# Patient Record
Sex: MALE | Race: WHITE | Employment: OTHER | URBAN - METROPOLITAN AREA
[De-identification: names, ages, dates, MRNs, and addresses within clinical notes are randomized per-mention and may not be internally consistent; named-entity substitution may affect disease eponyms.]

---

## 2019-02-01 ENCOUNTER — APPOINTMENT (OUTPATIENT)
Dept: CT IMAGING | Age: 29
End: 2019-02-01
Payer: MEDICAID

## 2019-02-01 ENCOUNTER — APPOINTMENT (OUTPATIENT)
Dept: GENERAL RADIOLOGY | Age: 29
End: 2019-02-01
Payer: MEDICAID

## 2019-02-01 ENCOUNTER — HOSPITAL ENCOUNTER (EMERGENCY)
Age: 29
Discharge: HOME | End: 2019-02-01
Payer: MEDICAID

## 2019-02-01 VITALS
WEIGHT: 200 LBS | HEART RATE: 94 BPM | TEMPERATURE: 98.7 F | SYSTOLIC BLOOD PRESSURE: 127 MMHG | OXYGEN SATURATION: 96 % | DIASTOLIC BLOOD PRESSURE: 77 MMHG | BODY MASS INDEX: 31.32 KG/M2 | RESPIRATION RATE: 16 BRPM

## 2019-02-01 DIAGNOSIS — M25.512 ACUTE PAIN OF LEFT SHOULDER: ICD-10-CM

## 2019-02-01 DIAGNOSIS — S00.01XA ABRASION OF SCALP, INITIAL ENCOUNTER: ICD-10-CM
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
## 2019-02-01 DIAGNOSIS — M25.561 ACUTE PAIN OF RIGHT KNEE: ICD-10-CM

## 2019-02-01 DIAGNOSIS — S09.90XA CLOSED HEAD INJURY, INITIAL ENCOUNTER: ICD-10-CM

## 2019-02-01 PROCEDURE — 71046 X-RAY EXAM CHEST 2 VIEWS: CPT

## 2019-02-01 PROCEDURE — 73030 X-RAY EXAM OF SHOULDER: CPT

## 2019-02-01 PROCEDURE — 70450 CT HEAD/BRAIN W/O DYE: CPT

## 2019-02-01 PROCEDURE — 6370000000 HC RX 637 (ALT 250 FOR IP): Performed by: PHYSICIAN ASSISTANT

## 2019-02-01 PROCEDURE — 99284 EMERGENCY DEPT VISIT MOD MDM: CPT

## 2019-02-01 PROCEDURE — 96372 THER/PROPH/DIAG INJ SC/IM: CPT

## 2019-02-01 PROCEDURE — 6360000002 HC RX W HCPCS: Performed by: PHYSICIAN ASSISTANT

## 2019-02-01 PROCEDURE — 72125 CT NECK SPINE W/O DYE: CPT

## 2019-02-01 PROCEDURE — 73560 X-RAY EXAM OF KNEE 1 OR 2: CPT

## 2019-02-01 RX ORDER — KETOROLAC TROMETHAMINE 30 MG/ML
60 INJECTION, SOLUTION INTRAMUSCULAR; INTRAVENOUS ONCE
Status: COMPLETED | OUTPATIENT
Start: 2019-02-01 | End: 2019-02-01

## 2019-02-01 RX ORDER — METHOCARBAMOL 500 MG/1
500 TABLET, FILM COATED ORAL 3 TIMES DAILY
Qty: 9 TABLET | Refills: 0 | Status: SHIPPED | OUTPATIENT
Start: 2019-02-01 | End: 2021-07-01 | Stop reason: CLARIF

## 2019-02-01 RX ORDER — NAPROXEN 500 MG/1
500 TABLET ORAL 2 TIMES DAILY PRN
Qty: 20 TABLET | Refills: 0 | Status: SHIPPED | OUTPATIENT
Start: 2019-02-01 | End: 2021-07-01 | Stop reason: CLARIF

## 2019-02-01 RX ORDER — ACETAMINOPHEN 500 MG
500 TABLET ORAL EVERY 6 HOURS PRN
Qty: 20 TABLET | Refills: 0 | Status: SHIPPED | OUTPATIENT
Start: 2019-02-01 | End: 2021-07-01 | Stop reason: CLARIF

## 2019-02-01 RX ORDER — HYDROCODONE BITARTRATE AND ACETAMINOPHEN 5; 325 MG/1; MG/1
1 TABLET ORAL ONCE
Status: COMPLETED | OUTPATIENT
Start: 2019-02-01 | End: 2019-02-01

## 2019-02-01 RX ADMIN — KETOROLAC TROMETHAMINE 60 MG: 30 INJECTION, SOLUTION INTRAMUSCULAR at 06:21

## 2019-02-01 RX ADMIN — HYDROCODONE BITARTRATE AND ACETAMINOPHEN 1 TABLET: 5; 325 TABLET ORAL at 07:07

## 2019-02-01 ASSESSMENT — PAIN DESCRIPTION - LOCATION: LOCATION: SHOULDER

## 2019-02-01 ASSESSMENT — PAIN DESCRIPTION - PAIN TYPE: TYPE: ACUTE PAIN

## 2019-02-01 ASSESSMENT — PAIN SCALES - GENERAL
PAINLEVEL_OUTOF10: 10
PAINLEVEL_OUTOF10: 9
PAINLEVEL_OUTOF10: 10
PAINLEVEL_OUTOF10: 9

## 2019-02-01 ASSESSMENT — PAIN DESCRIPTION - ORIENTATION: ORIENTATION: LEFT

## 2019-02-01 ASSESSMENT — PAIN DESCRIPTION - PROGRESSION: CLINICAL_PROGRESSION: GRADUALLY WORSENING

## 2021-07-01 ENCOUNTER — OFFICE VISIT (OUTPATIENT)
Dept: INTERNAL MEDICINE CLINIC | Age: 31
End: 2021-07-01
Payer: COMMERCIAL

## 2021-07-01 VITALS
HEIGHT: 66 IN | OXYGEN SATURATION: 98 % | DIASTOLIC BLOOD PRESSURE: 78 MMHG | RESPIRATION RATE: 20 BRPM | WEIGHT: 222.2 LBS | HEART RATE: 75 BPM | SYSTOLIC BLOOD PRESSURE: 121 MMHG | BODY MASS INDEX: 35.71 KG/M2

## 2021-07-01 DIAGNOSIS — R10.11 RUQ PAIN: ICD-10-CM

## 2021-07-01 DIAGNOSIS — G89.29 CHRONIC LEFT SHOULDER PAIN: ICD-10-CM

## 2021-07-01 DIAGNOSIS — Z11.59 NEED FOR HEPATITIS C SCREENING TEST: ICD-10-CM

## 2021-07-01 DIAGNOSIS — Z11.4 ENCOUNTER FOR SCREENING FOR HIV: ICD-10-CM

## 2021-07-01 DIAGNOSIS — F17.200 TOBACCO DEPENDENCE: ICD-10-CM

## 2021-07-01 DIAGNOSIS — R74.01 ELEVATED ALT MEASUREMENT: ICD-10-CM

## 2021-07-01 DIAGNOSIS — K21.9 GASTROESOPHAGEAL REFLUX DISEASE WITHOUT ESOPHAGITIS: ICD-10-CM

## 2021-07-01 DIAGNOSIS — Z00.00 ENCOUNTER FOR WELLNESS EXAMINATION: Primary | ICD-10-CM

## 2021-07-01 DIAGNOSIS — M25.512 CHRONIC LEFT SHOULDER PAIN: ICD-10-CM

## 2021-07-01 PROCEDURE — 4004F PT TOBACCO SCREEN RCVD TLK: CPT | Performed by: NURSE PRACTITIONER

## 2021-07-01 PROCEDURE — 99204 OFFICE O/P NEW MOD 45 MIN: CPT | Performed by: NURSE PRACTITIONER

## 2021-07-01 PROCEDURE — G8417 CALC BMI ABV UP PARAM F/U: HCPCS | Performed by: NURSE PRACTITIONER

## 2021-07-01 PROCEDURE — G8427 DOCREV CUR MEDS BY ELIG CLIN: HCPCS | Performed by: NURSE PRACTITIONER

## 2021-07-01 RX ORDER — OMEPRAZOLE 40 MG/1
40 CAPSULE, DELAYED RELEASE ORAL
Qty: 30 CAPSULE | Refills: 3 | Status: SHIPPED | OUTPATIENT
Start: 2021-07-01 | End: 2021-10-02

## 2021-07-01 ASSESSMENT — PATIENT HEALTH QUESTIONNAIRE - PHQ9
SUM OF ALL RESPONSES TO PHQ9 QUESTIONS 1 & 2: 0
SUM OF ALL RESPONSES TO PHQ QUESTIONS 1-9: 0
SUM OF ALL RESPONSES TO PHQ9 QUESTIONS 1 & 2: 0
2. FEELING DOWN, DEPRESSED OR HOPELESS: 0
SUM OF ALL RESPONSES TO PHQ QUESTIONS 1-9: 0
DEPRESSION UNABLE TO ASSESS: FUNCTIONAL CAPACITY MOTIVATION LIMITS ACCURACY
1. LITTLE INTEREST OR PLEASURE IN DOING THINGS: 0
SUM OF ALL RESPONSES TO PHQ QUESTIONS 1-9: 0
2. FEELING DOWN, DEPRESSED OR HOPELESS: 0
1. LITTLE INTEREST OR PLEASURE IN DOING THINGS: 0

## 2021-07-01 ASSESSMENT — ENCOUNTER SYMPTOMS
EYES NEGATIVE: 1
ABDOMINAL PAIN: 1
DIARRHEA: 0
SORE THROAT: 0
ABDOMINAL DISTENTION: 0
CHEST TIGHTNESS: 0
WHEEZING: 0
SINUS PRESSURE: 0
COLOR CHANGE: 0
SINUS PAIN: 0
COUGH: 0
NAUSEA: 0
SHORTNESS OF BREATH: 0
CONSTIPATION: 0

## 2021-07-01 NOTE — PROGRESS NOTES
Bindu Cardona   28 y.o.  male  [id number]      Chief Complaint   Patient presents with   [address] [facility] [place] Other     Pt has hx of elevated liver function         Subjective:  Patient is here to establish care. He was a previous patient of Dr Hugo Singh last being seen 4-5 years or more ago. Patient has a history of multiple orthopedic injuries/surgies (left shoulder, right knee, right hand) and current complaints are as below. Health maintenance reviewed with patient. Patient does smoke and has been a 1 ppd on average for about 21 years. Has failed Nicoderm patches, nicotine gum, Wellbutrin and Chantix. Considered seeing a hypnotist. Patient does drink alcohol occasionally. Patient  does not use drugs. His main concern today is that during the operative/follow up period from his recent left shoulder surgery. He states that this provider obtained a LFT test which showed an ALT of 89. Admits that he has been using Tylenol very frequently for pain control. Denies any blood in stools, or black stools, but does have frequent epigastric pain occasionally radiating into RUQ. Does take Tums frequently. Also recently with early satiety. After his left shoulder surgery he reports that he still has ongoing pain issues and his surgeon will no longer prescribe pain medications, advising him to only take Tylenol. Is interested in seeing pain management at this time. Patient's past medical, surgical, social and/or family history reviewed, updated in chart. Medications and allergies also reviewed and updatedin chart.      -wants Hep C/HIV Screen    Review of Systems   Constitutional: Negative for activity change, appetite change, fatigue and fever. HENT: Negative for congestion, sinus pressure, sinus pain and sore throat. Eyes: Negative. Respiratory: Negative for cough, chest tightness, shortness of breath and wheezing. Cardiovascular: Negative for chest pain and palpitations.    Gastrointestinal: About Running Out of Food in the Last Year:     Gerardo of Food in the Last Year:    Transportation Needs:     Lack of Transportation (Medical):  Lack of Transportation (Non-Medical):    Physical Activity:     Days of Exercise per Week:     Minutes of Exercise per Session:    Stress:     Feeling of Stress :    Social Connections:     Frequency of Communication with Friends and Family:     Frequency of Social Gatherings with Friends and Family:     Attends [employer] Services:     Active Member of Clubs or Organizations:     Attends Club or Organization Meetings:     Marital Status:    Intimate Partner Violence:     Fear of Current or Ex-Partner:     Emotionally Abused:     Physically Abused:     Sexually Abused:        Objective:  /78   Pulse 75   Resp 20   Ht 5' 6\" (1.676 m)   Wt 222 lb 3.2 oz (100.8 kg)   SpO2 98%   BMI 35.86 kg/m²   BP Readings from Last 3 Encounters:   07/01/21 121/78   02/01/19 127/77   11/10/14 110/80     Wt Readings from Last 3 Encounters:   07/01/21 222 lb 3.2 oz (100.8 kg)   02/01/19 200 lb (90.7 kg)   11/10/14 204 lb (92.5 kg)       Physical Exam  Constitutional:       General: He is not in acute distress. Appearance: He is well-developed. He is not diaphoretic. HENT:      Head: Normocephalic and atraumatic. Nose: Nose normal.   Eyes:      Conjunctiva/sclera: Conjunctivae normal.      Pupils: Pupils are equal, round, and reactive to light. Neck:      Thyroid: No thyromegaly. Cardiovascular:      Rate and Rhythm: Normal rate and regular rhythm. Heart sounds: Normal heart sounds. No murmur heard. Pulmonary:      Effort: Pulmonary effort is normal.      Breath sounds: Normal breath sounds. Abdominal:      General: Bowel sounds are normal. There is no distension. Palpations: Abdomen is soft. Tenderness: There is no abdominal tenderness. Musculoskeletal:         General: Normal range of motion.    Lymphadenopathy:      Cervical: No cervical adenopathy. Skin:     General: Skin is warm and dry. Capillary Refill: Capillary refill takes less than 2 seconds. Findings: No rash. Neurological:      Mental Status: He is alert and oriented to person, place, and time. GCS: GCS eye subscore is 4. GCS verbal subscore is 5. GCS motor subscore is 6. Cranial Nerves: No cranial nerve deficit. Sensory: No sensory deficit. Coordination: Coordination normal.      Deep Tendon Reflexes: Reflexes normal.   Psychiatric:         Behavior: Behavior normal.         Thought Content: Thought content normal.         No results found for: WBC, HGB, HCT, MCV, PLT  Lab Results   Component Value Date     11/10/2014    K 3.4 (L) 11/10/2014     11/10/2014    CO2 27 11/10/2014    BUN 14 11/10/2014    CREATININE 1.0 11/10/2014    GLUCOSE 100 (H) 11/10/2014    CALCIUM 9.4 11/10/2014    PROT 7.2 11/10/2014    LABALBU 4.7 11/10/2014    BILITOT 0.3 11/10/2014    ALKPHOS 99 11/10/2014    AST 19 11/10/2014    ALT 37 11/10/2014    LABGLOM >60 11/10/2014    GFRAA >60 11/10/2014    AGRATIO 1.9 11/10/2014    GLOB 2.5 11/10/2014     Lab Results   Component Value Date    CHOL 222 (H) 11/10/2014    CHOL 207 (H) 09/13/2013     Lab Results   Component Value Date    TRIG 326 (H) 11/10/2014    TRIG 208 (H) 09/13/2013     Lab Results   Component Value Date    HDL 32 (L) 11/10/2014    HDL 37 (L) 09/13/2013     Lab Results   Component Value Date    LDLCALC see below 11/10/2014    [address] (H) 09/13/2013     No results found for: LABA1C  No results found for: TSHFT4, TSH, TSHHS    ASSESSMENT:      No diagnosis found. PLAN:  1. BP and all vitals at goal; will check preventative labs and address any concerns. Otherwise, as below. 2. Possibly second to excessive Tylenol use; recheck LFT's today and also Hep C screen and Liver US. 3. Same as 2; check US, but seems more referred from epigastric region so I suspect this is likely more GERD related. 4. Start PPI therapy with omeprazole; call if no improvement in 1 month. 5. Patient was educated on the risks of continued smoking/tobacco use including, but not limited too, cancer and lung diease. Patient verbalizes understanding of risks, however, declines being ready to attempt complete cessation. States they will notify us when ready. Available resources and treatment options discussed with patient. 6. Referral to pain management per patient request as he feels as options have been exacerbated by his surgeon. 7. HIV Screen ordered  8. Hep C screen ordered. Care discussed with patient. Questions answered. Patient verbalizes understanding and agrees with plan. After visit summary provided. Advised to call forany problems, questions, or concerns. No orders of the defined types were placed in this encounter. No follow-ups on file.     MARTHA Broussard CNP  07/01/21  9:55 AM

## 2021-07-06 ENCOUNTER — HOSPITAL ENCOUNTER (OUTPATIENT)
Dept: ULTRASOUND IMAGING | Age: 31
Discharge: HOME | End: 2021-07-06
Payer: COMMERCIAL

## 2021-07-06 DIAGNOSIS — R10.11 RUQ PAIN: ICD-10-CM

## 2021-07-06 DIAGNOSIS — R74.01 ELEVATED ALT MEASUREMENT: ICD-10-CM

## 2021-07-06 PROCEDURE — 76705 ECHO EXAM OF ABDOMEN: CPT

## 2021-07-07 DIAGNOSIS — R10.11 RUQ PAIN: ICD-10-CM

## 2021-07-07 DIAGNOSIS — R10.11 RUQ PAIN: Primary | ICD-10-CM

## 2021-07-07 DIAGNOSIS — R74.01 ELEVATED ALT MEASUREMENT: ICD-10-CM

## 2021-07-07 DIAGNOSIS — Z00.00 ENCOUNTER FOR WELLNESS EXAMINATION: ICD-10-CM

## 2021-07-07 DIAGNOSIS — Z11.59 NEED FOR HEPATITIS C SCREENING TEST: ICD-10-CM

## 2021-07-07 DIAGNOSIS — Z11.4 ENCOUNTER FOR SCREENING FOR HIV: ICD-10-CM

## 2021-07-07 LAB
A/G RATIO: 1.9 (ref 1.1–2.2)
ALBUMIN SERPL-MCNC: 4.8 G/DL (ref 3.4–5)
ALP BLD-CCNC: 116 U/L (ref 40–129)
ALT SERPL-CCNC: 68 U/L (ref 10–40)
ANION GAP SERPL CALCULATED.3IONS-SCNC: 14 MMOL/L (ref 3–16)
AST SERPL-CCNC: 39 U/L (ref 15–37)
BASOPHILS ABSOLUTE: 0 K/UL (ref 0–0.2)
BASOPHILS RELATIVE PERCENT: 0.3 %
BILIRUB SERPL-MCNC: <0.2 MG/DL (ref 0–1)
BUN BLDV-MCNC: 14 MG/DL (ref 7–20)
CALCIUM SERPL-MCNC: 9.6 MG/DL (ref 8.3–10.6)
CHLORIDE BLD-SCNC: 103 MMOL/L (ref 99–110)
CHOLESTEROL, FASTING: 251 MG/DL (ref 0–199)
CO2: 23 MMOL/L (ref 21–32)
CREAT SERPL-MCNC: 1 MG/DL (ref 0.9–1.3)
EOSINOPHILS ABSOLUTE: 0.1 K/UL (ref 0–0.6)
EOSINOPHILS RELATIVE PERCENT: 1.2 %
GFR AFRICAN AMERICAN: >60
GFR NON-AFRICAN AMERICAN: >60
GLOBULIN: 2.5 G/DL
GLUCOSE BLD-MCNC: 103 MG/DL (ref 70–99)
HCT VFR BLD CALC: 47.6 % (ref 40.5–52.5)
HDLC SERPL-MCNC: 41 MG/DL (ref 40–60)
HEMOGLOBIN: 16.4 G/DL (ref 13.5–17.5)
HEPATITIS C ANTIBODY INTERPRETATION: NORMAL
LDL CHOLESTEROL CALCULATED: ABNORMAL MG/DL
LYMPHOCYTES ABSOLUTE: 2.2 K/UL (ref 1–5.1)
LYMPHOCYTES RELATIVE PERCENT: 31.5 %
MCH RBC QN AUTO: 30 PG (ref 26–34)
MCHC RBC AUTO-ENTMCNC: 34.4 G/DL (ref 31–36)
MCV RBC AUTO: 87.2 FL (ref 80–100)
MONOCYTES ABSOLUTE: 0.5 K/UL (ref 0–1.3)
MONOCYTES RELATIVE PERCENT: 7.1 %
NEUTROPHILS ABSOLUTE: 4.2 K/UL (ref 1.7–7.7)
NEUTROPHILS RELATIVE PERCENT: 59.9 %
PDW BLD-RTO: 13.9 % (ref 12.4–15.4)
PLATELET # BLD: 274 K/UL (ref 135–450)
PMV BLD AUTO: 7.7 FL (ref 5–10.5)
POTASSIUM SERPL-SCNC: 4.7 MMOL/L (ref 3.5–5.1)
RBC # BLD: 5.46 M/UL (ref 4.2–5.9)
SODIUM BLD-SCNC: 140 MMOL/L (ref 136–145)
TOTAL PROTEIN: 7.3 G/DL (ref 6.4–8.2)
TRIGLYCERIDE, FASTING: 374 MG/DL (ref 0–150)
VLDLC SERPL CALC-MCNC: ABNORMAL MG/DL
WBC # BLD: 7 K/UL (ref 4–11)

## 2021-07-07 PROCEDURE — 36415 COLL VENOUS BLD VENIPUNCTURE: CPT | Performed by: NURSE PRACTITIONER

## 2021-07-08 LAB
HIV AG/AB: NORMAL
HIV ANTIGEN: NORMAL
HIV-1 ANTIBODY: NORMAL
HIV-2 AB: NORMAL
LDL CHOLESTEROL DIRECT: 171 MG/DL
TSH REFLEX: 0.93 UIU/ML (ref 0.27–4.2)

## 2021-07-08 RX ORDER — PRAVASTATIN SODIUM 20 MG
20 TABLET ORAL DAILY
Qty: 30 TABLET | Refills: 2 | Status: SHIPPED | OUTPATIENT
Start: 2021-07-08 | End: 2021-10-02

## 2021-08-04 ENCOUNTER — OFFICE VISIT (OUTPATIENT)
Dept: GASTROENTEROLOGY | Age: 31
End: 2021-08-04
Payer: COMMERCIAL

## 2021-08-04 VITALS
BODY MASS INDEX: 34.09 KG/M2 | SYSTOLIC BLOOD PRESSURE: 124 MMHG | HEART RATE: 86 BPM | DIASTOLIC BLOOD PRESSURE: 80 MMHG | TEMPERATURE: 97.2 F | WEIGHT: 217.2 LBS | HEIGHT: 67 IN | OXYGEN SATURATION: 90 %

## 2021-08-04 DIAGNOSIS — K92.1 BLOOD IN STOOL: Primary | ICD-10-CM

## 2021-08-04 DIAGNOSIS — R10.11 RIGHT UPPER QUADRANT ABDOMINAL PAIN: ICD-10-CM

## 2021-08-04 DIAGNOSIS — R11.0 NAUSEA: ICD-10-CM

## 2021-08-04 DIAGNOSIS — R74.8 ELEVATED LIVER ENZYMES: ICD-10-CM

## 2021-08-04 DIAGNOSIS — K21.9 GASTROESOPHAGEAL REFLUX DISEASE, UNSPECIFIED WHETHER ESOPHAGITIS PRESENT: ICD-10-CM

## 2021-08-04 DIAGNOSIS — K59.04 CHRONIC IDIOPATHIC CONSTIPATION: ICD-10-CM

## 2021-08-04 PROCEDURE — 4004F PT TOBACCO SCREEN RCVD TLK: CPT | Performed by: NURSE PRACTITIONER

## 2021-08-04 PROCEDURE — G8427 DOCREV CUR MEDS BY ELIG CLIN: HCPCS | Performed by: NURSE PRACTITIONER

## 2021-08-04 PROCEDURE — 99204 OFFICE O/P NEW MOD 45 MIN: CPT | Performed by: NURSE PRACTITIONER

## 2021-08-04 PROCEDURE — G8417 CALC BMI ABV UP PARAM F/U: HCPCS | Performed by: NURSE PRACTITIONER

## 2021-08-04 RX ORDER — DOCUSATE SODIUM 100 MG/1
100 CAPSULE, LIQUID FILLED ORAL 2 TIMES DAILY
Qty: 60 CAPSULE | Refills: 3 | Status: SHIPPED | OUTPATIENT
Start: 2021-08-04 | End: 2021-09-03

## 2021-08-04 RX ORDER — POLYETHYLENE GLYCOL 3350 17 G/17G
17 POWDER, FOR SOLUTION ORAL 2 TIMES DAILY
Qty: 1020 G | Refills: 3 | Status: SHIPPED | OUTPATIENT
Start: 2021-08-04 | End: 2021-10-02

## 2021-08-04 RX ORDER — POLYETHYLENE GLYCOL 3350, SODIUM SULFATE ANHYDROUS, SODIUM BICARBONATE, SODIUM CHLORIDE, POTASSIUM CHLORIDE 236; 22.74; 6.74; 5.86; 2.97 G/4L; G/4L; G/4L; G/4L; G/4L
4 POWDER, FOR SOLUTION ORAL ONCE
Qty: 4000 ML | Refills: 0 | Status: SHIPPED | OUTPATIENT
Start: 2021-08-04 | End: 2021-08-04

## 2021-08-04 RX ORDER — ONDANSETRON 4 MG/1
4 TABLET, FILM COATED ORAL DAILY PRN
Qty: 30 TABLET | Refills: 3 | Status: SHIPPED | OUTPATIENT
Start: 2021-08-04 | End: 2021-10-02

## 2021-08-04 ASSESSMENT — ENCOUNTER SYMPTOMS
BLOOD IN STOOL: 1
EYE DISCHARGE: 0
BACK PAIN: 1
WHEEZING: 0
ABDOMINAL PAIN: 1
CONSTIPATION: 1
NAUSEA: 1
SHORTNESS OF BREATH: 0
COLOR CHANGE: 0
EYE PAIN: 0
VOMITING: 0
COUGH: 1
DIARRHEA: 1

## 2021-08-04 NOTE — PATIENT INSTRUCTIONS
Patient Education        Liver Disease Diet: Care Instructions  Overview     The liver does many jobs that are vital to the rest of your body. When something is wrong with the liver, your body may not get the nutrition it needs. It is important that you eat a healthy diet that includes a variety of foods from the basic food groups: grains, vegetables, fruits, dairy, and protein foods. Follow your doctor's instructions for eating carbohydrate, protein, and fat in the right amounts for you. Your doctor also may limit salt or take salt out of your diet to help protect the liver. Always talk with your doctor or dietitian before you make changes in your diet. Follow-up care is a key part of your treatment and safety. Be sure to make and go to all appointments, and call your doctor if you are having problems. It's also a good idea to know your test results and keep a list of the medicines you take. How can you care for yourself at home? · Work with your doctor or dietitian to create a food plan that guides your daily food choices. · Do not skip meals or go for many hours without eating. If you eat several small meals during the day, you have a better chance of getting the extra calories your body needs for energy. · Follow your doctor's or dietitian's instructions on how to get the right amount of protein in your diet. Examples of animal protein are:  ? Meat, fish, and poultry. ? Eggs. ? Milk and milk products. · Your doctor or dietitian may ask you to eat a certain amount of protein that comes from plants (rather than protein that comes from animals). You can get plant protein from foods such as:  ? Cooked dried beans and peas. ? Peanut butter, nuts, and seeds. ? Tofu. · Limit salt, if your doctor tells you to. This will help prevent fluid buildup in your belly and chest, which can cause serious problems. Salt is in many prepared foods, such as zuñiga, canned foods, snack foods, sauces, and soups.  Look for reduced-salt products. · Your doctor may recommend vitamin and mineral supplements. However, do not take any other medicine, including over-the-counter medicines, vitamins, and herbal products, without talking to your doctor first.  · Do not drink any alcohol. It can harm your liver. Talk to your doctor if you need help to stop drinking. · If you have a loss of appetite or have nausea or vomiting, try to:  ? Stay away from foods and food smells that make you feel worse. ? Avoid greasy or fatty foods. ? Eat food that settles your stomach when it feels upset. Try crackers, dry toast, or kenneth (kenneth tea, hard kenneth candy, or crystallized kenneth). When should you call for help? Watch closely for changes in your health, and be sure to contact your doctor if you have any problems. Where can you learn more? Go to https://[email or web address].eSpark. org and sign in to your [employer] account. Enter X625 in the Quora box to learn more about \"Liver Disease Diet: Care Instructions. \"     If you do not have an account, please click on the \"Sign Up Now\" link. Current as of: December 17, 2020               Content Version: 12.9  © 2006-2021 Healthwise, Incorporated. Care instructions adapted under license by [facility] [place] ([facility]). If you have questions about a medical condition or this instruction, always ask your healthcare professional. [place] [address] any warranty or liability for your use of this information.

## 2021-08-04 NOTE — PROGRESS NOTES
Shelia Austin 28 y.o. male was seen by MIRIAN Vazquez on 8/4/2021     Wt Readings from Last 3 Encounters:   08/04/21 217 lb 3.2 oz (98.5 kg)   07/01/21 222 lb 3.2 oz (100.8 kg)   02/01/19 200 lb (90.7 kg)       HPI  Shelia Austin is a pleasant 28 y.o.  male who presents today for acid reflux, blood in stool, right upper quadrant pain and elevated liver enzymes. He has a past medical history of clotting disorder and hernia, inguinal, left. He denies alcohol use. He has never had an EGD or colonoscopy. He mentioned having two surgeries in the past four years with poor pain control. Per patient record in 2019 he took additional Tylenol 500 mg six to eight tablets every hour for max of roughly 65 capsules daily for  right hand surgery. In April of 2021 he had left shoulder surgery and took Tylenol 500 mg (75 to 80 capsules per day) until the end of June 2021. His abdominal ultrasound done on 7-6-2021 showed nonspecific hepatomegaly measuring 21 cm, otherwise normal ultrasound. His LFTS on 6- showed Albumin 4.6, Total Bilirubin 0.4, , ALT 89 and AST 35. His appetite is poor with early satiety. He drinks aleena orange creme soda at least six to eight bottles per day. He He is only eating one meal per day. He does eat late at night. His appetite has changed in the last four months. His weight is stable. In the last four months having nausea daily. Vomiting bile; with last episode this morning. His heartburn and acid reflux has been ongoing for twelve years. His heartburn is poorly controlled with Prilosec. He has been taking Prilosec with food. Nocturnal awakenings with acid reflux every evening. No dysphagia or pain with swallowing. No current abdominal pain. His last episode of pain occurred last night to his right upper quadrant described as a 8/10 dull pain with sharp stabbing pain. Pain is worse if he takes Tylenol or Excedrin and activity.   Pain improves with time and rest.  No excess belching. He has excess flatulence. He has chronic constipaton and diarrhea for the last ten years. His typical bowel pattern is once a week with soft brown formed to loose stools. Diarrhea once a week. He has noticed blood and mucus in his stools with each bowel movement; he first noticed ten years ago. He has hemorrhoids. No melena. His is uncertain of his family history for stomach or colon cancer. ROS  Review of Systems   Constitutional: Positive for appetite change. Negative for chills, diaphoresis, fatigue, fever and unexpected weight change. HENT: Negative for ear pain, hearing loss and tinnitus. Eyes: Negative for pain, discharge and visual disturbance. Respiratory: Positive for cough. Negative for shortness of breath and wheezing. Cardiovascular: Negative for chest pain, palpitations and leg swelling. Gastrointestinal: Positive for abdominal pain, blood in stool, constipation, diarrhea and nausea. Negative for vomiting. Endocrine: Negative for cold intolerance and heat intolerance. Genitourinary: Negative for dysuria, frequency, hematuria and urgency. Musculoskeletal: Positive for back pain. Negative for myalgias and neck pain. Skin: Negative for color change, pallor and rash. Allergic/Immunologic: Positive for food allergies (coconut). Negative for environmental allergies. Neurological: Positive for headaches. Negative for dizziness, seizures and weakness. Hematological: Bruises/bleeds easily. Psychiatric/Behavioral: Negative for dysphoric mood and sleep disturbance. The patient is not nervous/anxious.         Allergies  Allergies   Allergen Reactions    Nortriptyline Rash    Paroxetine Shortness Of Breath    Coconut Flavor      Anything with coconut       Medications  Current Outpatient Medications   Medication Sig Dispense Refill    omeprazole (PRILOSEC) 40 MG delayed release capsule Take 1 capsule by mouth every morning (before breakfast) 30 capsule 3    pravastatin (PRAVACHOL) 20 MG tablet Take 1 tablet by mouth daily (Patient not taking: Reported on 8/4/2021) 30 tablet 2     No current facility-administered medications for this visit. Past medical history:   He has a past medical history of Clotting disorder ([address] and Hernia, inguinal, left. Past surgical history:  He has a past surgical history that includes Knee arthroscopy (Right, 2002). Social History:  He reports that he has been smoking cigarettes. He has a 21.00 pack-year smoking history. He has quit using smokeless tobacco.  His smokeless tobacco use included chew. He reports that he does not drink alcohol and does not use drugs. Family history:  His family history includes Cancer in his maternal grandmother; Diabetes in his mother; Emphysema in his maternal grandmother. Objective    Vitals:    08/04/21 1340   BP: 124/80   Pulse: 86   Temp: 97.2 °F (36.2 °C)   SpO2: 90%        Physical exam    Physical Exam  Vitals reviewed. Constitutional:       General: He is not in acute distress. Appearance: He is well-developed. He is obese. He is not ill-appearing, toxic-appearing or diaphoretic. HENT:      Head: Normocephalic and atraumatic. Nose: Nose normal.      Mouth/Throat:      Mouth: Mucous membranes are moist.   Eyes:      Conjunctiva/sclera: Conjunctivae normal.      Pupils: Pupils are equal, round, and reactive to light. Neck:      Thyroid: No thyromegaly. Vascular: No JVD. Trachea: No tracheal deviation. Cardiovascular:      Rate and Rhythm: Normal rate and regular rhythm. Pulses: Normal pulses. Heart sounds: Normal heart sounds. No murmur heard. No friction rub. No gallop. Pulmonary:      Effort: Pulmonary effort is normal. No respiratory distress. Breath sounds: Normal breath sounds. No stridor. No wheezing, rhonchi or rales. Chest:      Chest wall: No tenderness.    Abdominal:      General: Bowel sounds are normal. There is no distension. Palpations: Abdomen is soft. There is no mass. Tenderness: There is no abdominal tenderness. There is no guarding or rebound. Hernia: No hernia is present. Musculoskeletal:         General: Normal range of motion. Cervical back: Normal range of motion and neck supple. Lymphadenopathy:      Cervical: No cervical adenopathy. Skin:     General: Skin is warm and dry. Neurological:      Mental Status: He is alert and oriented to person, place, and time. Psychiatric:         Mood and Affect: Mood normal.         Orders Only on 07/07/2021   Component Date Value Ref Range Status    TSH 07/07/2021 0.93  0.27 - 4.20 uIU/mL Final    Cholesterol, Fasting 07/07/2021 251* 0 - 199 mg/dL Final    Triglyceride, Fasting 07/07/2021 374* 0 - 150 mg/dL Final    HDL 07/07/2021 41  40 - 60 mg/dL Final    LDL Calculated 07/07/2021 see below  <100 mg/dL Final    Comment: When the triglyceride is <30 mg/dL or >300 mg/dL the  calculated LDL and  VLDL are not valid and a measured  LDL is performed.  VLDL Cholesterol Calculated 07/07/2021 see below  Not Established mg/dL Final    Comment: When the triglyceride is <30 mg/dL or >300 mg/dL the  calculated LDL and  VLDL are not valid and a measured  LDL is performed.  Sodium 07/07/2021 140  136 - 145 mmol/L Final    Potassium 07/07/2021 4.7  3.5 - 5.1 mmol/L Final    Chloride 07/07/2021 103  99 - 110 mmol/L Final    CO2 07/07/2021 23  21 - 32 mmol/L Final    Anion Gap 07/07/2021 14  3 - 16 Final    Glucose 07/07/2021 103* 70 - 99 mg/dL Final    BUN 07/07/2021 14  7 - 20 mg/dL Final    CREATININE 07/07/2021 1.0  0.9 - 1.3 mg/dL Final    GFR Non- 07/07/2021 >60  >60 Final    Comment: >60 mL/min/1.73m2 EGFR, calc. for ages 25 and older using the  MDRD formula (not corrected for weight), is valid for stable  renal function.       GFR  07/07/2021 >60  >60 Final Comment: Chronic Kidney Disease: less than 60 ml/min/1.73 sq.m. Kidney Failure: less than 15 ml/min/1.73 sq.m. Results valid for patients 18 years and older.       Calcium 07/07/2021 9.6  8.3 - 10.6 mg/dL Final    Total Protein 07/07/2021 7.3  6.4 - 8.2 g/dL Final    Albumin 07/07/2021 4.8  3.4 - 5.0 g/dL Final    Albumin/Globulin Ratio 07/07/2021 1.9  1.1 - 2.2 Final    Total Bilirubin 07/07/2021 <0.2  0.0 - 1.0 mg/dL Final    Alkaline Phosphatase 07/07/2021 116  40 - 129 U/L Final    ALT 07/07/2021 68* 10 - 40 U/L Final    AST 07/07/2021 39* 15 - 37 U/L Final    Globulin 07/07/2021 2.5  g/dL Final    WBC 07/07/2021 7.0  4.0 - 11.0 K/uL Final    RBC 07/07/2021 5.46  4.20 - 5.90 M/uL Final    Hemoglobin 07/07/2021 16.4  13.5 - 17.5 g/dL Final    Hematocrit 07/07/2021 47.6  40.5 - 52.5 % Final    MCV 07/07/2021 87.2  80.0 - 100.0 fL Final    MCH 07/07/2021 30.0  26.0 - 34.0 pg Final    MCHC 07/07/2021 34.4  31.0 - 36.0 g/dL Final    RDW 07/07/2021 13.9  12.4 - 15.4 % Final    Platelets 78/67/7983 274  135 - 450 K/uL Final    MPV 07/07/2021 7.7  5.0 - 10.5 fL Final    Neutrophils % 07/07/2021 59.9  % Final    Lymphocytes % 07/07/2021 31.5  % Final    Monocytes % 07/07/2021 7.1  % Final    Eosinophils % 07/07/2021 1.2  % Final    Basophils % 07/07/2021 0.3  % Final    Neutrophils Absolute 07/07/2021 4.2  1.7 - 7.7 K/uL Final    Lymphocytes Absolute 07/07/2021 2.2  1.0 - 5.1 K/uL Final    Monocytes Absolute 07/07/2021 0.5  0.0 - 1.3 K/uL Final    Eosinophils Absolute 07/07/2021 0.1  0.0 - 0.6 K/uL Final    Basophils Absolute 07/07/2021 0.0  0.0 - 0.2 K/uL Final    Hep C Ab Interp 07/07/2021 Non-reactive  Non-reactive Final    HIV Ag/Ab 07/07/2021 Non-Reactive  Non-reactive Final    HIV-1 Antibody 07/07/2021 Non-Reactive  Non-reactive Final    HIV ANTIGEN 07/07/2021 Non-Reactive  Non-reactive Final    HIV-2 Ab 07/07/2021 Non-Reactive  Non-reactive Final    LDL Direct 07/07/2021 171* <100 mg/dL Final       Assessment and Plan:  1. Will plan for an EGD/colonoscopy with MAC anesthesia. The patient was informed of the risks and benefits of the procedures. 2.  Right upper quadrant abdominal pain might be secondary to irritable bowel syndrome, acid reflux, gastritis, or peptic ulcer disease. Will order EGD to rule out peptic ulcer disease. His abdominal ultrasound done on 7-6-2021 showed nonspecific hepatomegaly measuring 21 cm, otherwise normal ultrasound. His LFTS on 6- showed Albumin 4.6, Total Bilirubin 0.4, , ALT 89 and AST 35. Recommend avoidance of Tylenol and NSAID's. The patient was encouraged to eat a low fat diet. Cut out soda use and drink water. 3.  GERD that is long term without dysphagia or odynophagia. The patient was encouraged to continue taking Prilosec daily for treatment of acid reflux. Instructed to take half an hour before breakfast on an empty stomach. Will order EGD to rule out peptic ulcer disease. The patient was encouraged to continue with anti-reflux measures and avoid foods that worsen. Instructed to avoid eating late at night. 4.  Persistent nausea might be secondary to irritable bowel syndrome (IBS) or gastritis and non-ulcer dyspepsia and acid reflux. The patient was encouraged to take anti-nausea medication as needed. Will order endoscopy to rule out peptic ulcer disease or gastritis. Recommend eating small frequent meals that are low in fat. 5.  Blood in stool most likely due to hemorrhoids. Will order colonoscopy to rule out inflammatory bowel disease, colon polyps and for colorectal cancer surveillance. The patient was instructed to avoid straining with bowel movements and avoid straining with bowel movements. 6.  Chronic constipation most likely diet related. Will order miralax and stool softeners take twice daily for treatment of constipation. The patient was instructed to avoid foods that are constipating.  Recommend

## 2021-08-18 ENCOUNTER — TELEPHONE (OUTPATIENT)
Dept: INTERNAL MEDICINE CLINIC | Age: 31
End: 2021-08-18

## 2021-08-18 NOTE — TELEPHONE ENCOUNTER
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 female with CVID who is here without complaints for her 1st 3 month follow-up since beginning subcu immunoglobulin.    She says she notices improvement.  She has had no bacterial infections since her last visit.  She has had 2 URIs but both remained uncomplicated.  She states no significant problems with the infusion.  She takes Advil prior to the infusion and sometimes Zofran.    She denies any asthma exacerbations since her last visit.  She continues to complain of postnasal drip with cough that is unchanged.    Related medications from epic -- not addressed  Hizentra 10gram/50ml (20% soln):  200mg/kg every week  Zofran prn nausea  Singulair 10 mg  DuoNeb p.r.n.  Albuterol p.r.n.  Azelastine  Xyzal 5 mg  Hydroxyzine 25 mg t.i.d. p.r.n.  Topicort 0.05% cream  (beta-blocker)  Epinephrine  Chair a Tussin AC (which contains 10 mg Codeine per 5 mL)  Doxycycline    No new problems or complaints.    Additional History:   Interval Hx is unremarkable.  Client is a lifetime nonsmoker. Client  reports that  has never smoked. she has never used smokeless tobacco..   Past medical, family, and social histories are unchanged.       Patient Active Problem List   Diagnosis    Vitamin D deficiency    Hypothyroidism    Irritable bowel syndrome    Elevated alkaline phosphatase level    Malignant carcinoid tumor of appendix    Fever    Elevated sed rate    CRP elevated    Hypertension    Morbid obesity    Abnormal CT of the chest    Eczema    Trigeminal neuralgia of right side of face    IgG deficiency    POTS (postural orthostatic tachycardia syndrome)     Current Outpatient Medications on File Prior to Visit  Is he wanting something just for before and during the flight, or for anxiety overall. "  Medication Sig Dispense Refill    ALBUTEROL INHL Inhale into the lungs as needed.       albuterol-ipratropium 2.5mg-0.5mg/3mL (DUO-NEB) 0.5 mg-3 mg(2.5 mg base)/3 mL nebulizer solution Take 3 mLs by nebulization every 6 (six) hours as needed for Wheezing. Rescue 1 Box 2    azelastine (ASTELIN) 137 mcg (0.1 %) nasal spray 2 SPRAYS each nostril Q 12 H PRN 30 mL 6    BD LUER-NANCY SYRINGE 3 mL 22 gauge x 1" Syrg INJECT QD FOR 5 DAYS THEN WEEKLY FOR 5 WEEKS THEN MONTHLY  3    cloNIDine (CATAPRES) 0.1 MG tablet TAKE 1 TABLET BY MOUTH EVERY 6 HOURS AS NEEDED FOR FLUSHING 20 tablet 0    cyanocobalamin, vitamin B-12, 1,000 mcg/mL Kit Inject 1,000 mcg as directed every 28 days. Take daily for 5 days then weekly for 5 weeks then monthly 12 kit 1    desoximetasone (TOPICORT) 0.05 % cream Apply topically.      dicyclomine (BENTYL) 10 MG capsule Take by mouth.      ergocalciferol (ERGOCALCIFEROL) 50,000 unit Cap Take 1 capsule (50,000 Units total) by mouth every 7 days. 12 capsule 3    estrogens, conjugated, (PREMARIN) 1.25 MG tablet Take by mouth.      estropipate (OGEN) 1.5 MG tablet Take 1 tablet (1.5 mg total) by mouth once daily. 90 tablet 3    gabapentin (NEURONTIN) 300 MG capsule Take 300 mg by mouth 2 (two) times daily. 2-300 mg cap in AM and 3-300 mg cap in PM      guaifenesin-codeine 100-10 mg/5 ml (CHERATUSSIN AC)  mg/5 mL syrup Take 10 mLs by mouth every 4 (four) hours as needed for Cough. 120 mL 0    hydrOXYzine HCl (ATARAX) 25 MG tablet Take 1 tablet (25 mg total) by mouth 3 (three) times daily. 45 tablet 1    immun glob G,IgG,-pro-IgA 0-50 (HIZENTRA) 10 gram/50 mL (20 %) Soln Inject 200 mg/kg into the skin every 7 days. 4 vial 5    levocetirizine (XYZAL) 5 MG tablet TK 1 T PO  ONCE Day  9    metoclopramide HCl (REGLAN) 10 MG tablet Take 1 tablet (10 mg total) by mouth every 6 (six) hours. 30 tablet 0    metoprolol tartrate (LOPRESSOR) 50 MG tablet Take 50 mg by mouth 4 (four) times daily. 1 " "Tablet Oral Three times a day      montelukast (SINGULAIR) 10 mg tablet Take by mouth.      ondansetron (ZOFRAN-ODT) 4 MG TbDL Take 1 tablet (4 mg total) by mouth every 8 (eight) hours as needed (nausea). 20 tablet 0    syringe with needle, safety 3 mL 22 gauge x 1 1/2" Syrg B12 injection every day for 5 days then weekly for 5 weeks then monthly. 50 Syringe 3    tiZANidine 4 mg Cap TK 2 TO 3 CS PO TID WITH FOOD  1    traZODone (DESYREL) 100 MG tablet TK 1 T PO  HS  1    triamcinolone acetonide 0.1% (KENALOG) 0.1 % cream 2 (two) times daily. Apply to affected area  0    vortioxetine (BRINTELLIX) 10 mg Tab Take by mouth.      zaleplon (SONATA) 10 MG capsule Take 10 mg by mouth once daily.  2    [DISCONTINUED] BRINTELLIX 20 mg Tab once daily.   2    [DISCONTINUED] carBAMazepine (TEGRETOL XR) 200 MG 12 hr tablet Take 1 tab twice daily.  If no improvement after 2 weeks, take 2 tabs twice daily. 120 tablet 5    [DISCONTINUED] doxycycline (MONODOX) 100 MG capsule Take 1 capsule (100 mg total) by mouth 2 (two) times daily. 20 capsule 0    [DISCONTINUED] oxycodone-acetaminophen  mg (PERCOCET)  mg per tablet Take 1 tablet by mouth every 4 (four) hours as needed.      [DISCONTINUED] temazepam (RESTORIL) 15 mg Cap Take 15 mg by mouth every evening.  3    [DISCONTINUED] valsartan (DIOVAN) 80 MG tablet TK 1 T PO QD  8    diclofenac sodium (PENNSAID) 2 % SoPk Apply topically.      EPINEPHrine (AUVI-Q) 0.3 mg/0.3 mL AtIn Inject 0.3 mLs (0.3 mg total) into the muscle once. 2 Device 0    metoprolol tartrate (LOPRESSOR) 50 MG tablet       nitrofurantoin (MACRODANTIN) 50 MG capsule        No current facility-administered medications on file prior to visit.          Review of Systems   Constitutional: Negative for chills and fever.   HENT: Negative for ear discharge and nosebleeds.    Eyes: Negative for discharge and redness.   Respiratory: Positive for cough and wheezing. Negative for hemoptysis, sputum " "production, shortness of breath and stridor.    Cardiovascular: Positive for chest pain. Negative for palpitations.   Gastrointestinal: Negative for blood in stool, melena and vomiting.   Genitourinary: Positive for dysuria. Negative for hematuria.   Skin: Negative for itching and rash.   Neurological: Negative for seizures and loss of consciousness.       Objective:   /88 (BP Location: Left arm, Patient Position: Sitting, BP Method: Large (Manual))   Wt 119 kg (262 lb 5.6 oz)   BMI 45.03 kg/m²       Physical Exam   Constitutional: She is oriented to person, place, and time and well-developed, well-nourished, and in no distress.   HENT:   Head: Normocephalic and atraumatic.   Nose: Nose normal.   TMs clear.  Nares pink with mild turbinates swelling.  Oropharynx benign without exudate.   Eyes: Conjunctivae are normal. No scleral icterus.   Neck: Neck supple.   Cardiovascular: Normal rate, regular rhythm and intact distal pulses.   Pulmonary/Chest: Effort normal. No stridor. No respiratory distress.   Abdominal: She exhibits no distension.   Musculoskeletal: She exhibits no edema or deformity.   Lymphadenopathy:     She has no cervical adenopathy.   Neurological: She is alert and oriented to person, place, and time.   Skin: No rash noted. No erythema.   Psychiatric: Memory and affect normal.       Data:   IgG 600 (01/25/2018)  Poor response to pneumococcal vaccine    Chest x-ray (PA and lateral, 10/24/2017):  Normal  No CT of chest available in [facility]    CT sinus (01/22/2018): "Unremarkable noncontrast CT of the paranasal sinuses without significant paranasal sinus opacification or air-fluid level."    No spirometry available in Epic    Status post pneumococcal polysaccharide vaccine 01/22/2018  Status post pneumococcal protein conjugated vaccine 03/16/2018  Assessment:     1. Common variable immunodeficiency    2. Nonallergic rhinitis    3. Dysfunction of Eustachian tube, unspecified laterality        Plan: "     Medical Decision Making:    Kianna was seen today for follow-up.    Diagnoses and all orders for this visit:    Common variable immunodeficiency    Nonallergic rhinitis with post nasal drip - stable    Dysfunction of Eustachian tube, unspecified laterality - quiescent        There are no Patient Instructions on file for this visit.    No Follow-up on file.    Kaylin Miller MD

## 2021-08-18 NOTE — TELEPHONE ENCOUNTER
Pt stated that he would just like some medication to help with before and after the flight. Please advise.

## 2021-08-18 NOTE — TELEPHONE ENCOUNTER
I am going on a round trip flight at the end of August, and I was wondering if it would be possible to have something prescribed to me for anxiety? 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 I have tried a couple different OTC anxiety medications since I found out, and they are doing absolutely nothing. Please let me know, thank you.   Please advise

## 2021-08-24 ENCOUNTER — TELEPHONE (OUTPATIENT)
Dept: INTERNAL MEDICINE CLINIC | Age: 31
End: 2021-08-24

## 2021-08-24 DIAGNOSIS — Z01.818 PRE-OP TESTING: Primary | ICD-10-CM

## 2021-08-24 DIAGNOSIS — F40.243 FEAR OF FLYING: Primary | ICD-10-CM

## 2021-08-24 RX ORDER — LORAZEPAM 0.5 MG/1
0.5 TABLET ORAL 2 TIMES DAILY PRN
Qty: 4 TABLET | Refills: 0 | Status: SHIPPED | OUTPATIENT
Start: 2021-08-24 | End: 2021-08-26

## 2021-08-24 NOTE — TELEPHONE ENCOUNTER
Pt stated that he would just like some medication to help with before and after the flight. Please advise.      Please advise

## 2021-09-07 NOTE — PROGRESS NOTES
Surgery: [email or web address]                         Arrival time: 1130  Nothing to eat or drink after midnight unless instructed to take certain medications by the doctor or the nurse the am of surgery  Arrive at the front information desk -  Please leave money and all other valuables at home. Wear comfortable clothing. If you wear contacts please bring a case. No make up. You may be asked to remove rings or body piercing. Please bring insurance cards and picture ID am of procedure. Please bring any consent or paper work from your doctor. If you become ill,such as a cold, sore throat or fever contact your doctor. Please bathe or shower am of procedure.   Medications to take AM of procedure:     Any questions call [facility] [place]  [phone]

## 2021-09-08 ENCOUNTER — HOSPITAL ENCOUNTER (OUTPATIENT)
Age: 31
Setting detail: SPECIMEN
Discharge: HOME | End: 2021-09-08
Payer: COMMERCIAL

## 2021-09-08 ENCOUNTER — TELEPHONE (OUTPATIENT)
Dept: GASTROENTEROLOGY | Age: 31
End: 2021-09-08

## 2021-09-08 ENCOUNTER — NURSE ONLY (OUTPATIENT)
Dept: SURGERY | Age: 31
End: 2021-09-08

## 2021-09-08 DIAGNOSIS — Z01.818 PRE-OP TESTING: Primary | ICD-10-CM

## 2021-09-08 PROCEDURE — U0005 INFEC AGEN DETEC AMPLI PROBE: HCPCS

## 2021-09-08 PROCEDURE — U0003 INFECTIOUS AGENT DETECTION BY NUCLEIC ACID (DNA OR RNA); SEVERE ACUTE RESPIRATORY SYNDROME CORONAVIRUS 2 (SARS-COV-2) (CORONAVIRUS DISEASE [COVID-19]), AMPLIFIED PROBE TECHNIQUE, MAKING USE OF HIGH THROUGHPUT TECHNOLOGIES AS DESCRIBED BY CMS-2020-01-R: HCPCS

## 2021-09-10 LAB
SARS-COV-2: NOT DETECTED
SOURCE: NORMAL

## 2021-09-13 ENCOUNTER — ANESTHESIA EVENT (OUTPATIENT)
Dept: OPERATING ROOM | Age: 31
End: 2021-09-13
Payer: COMMERCIAL

## 2021-09-13 ASSESSMENT — LIFESTYLE VARIABLES: SMOKING_STATUS: 1

## 2021-09-13 NOTE — ANESTHESIA PRE PROCEDURE
Department of Anesthesiology  Preprocedure Note       Name:  Kim Avina   Age:  28 y.o.  :  1990                                          MRN:  [id number]         Date:  2021      Surgeon: Celena Devine):  Ольга Mendez MD    Procedure: Procedure(s):  COLONOSCOPY DIAGNOSTIC  EGD ESOPHAGOGASTRODUODENOSCOPY    Medications prior to admission:   Prior to Admission medications    Medication Sig Start Date End Date Taking? Authorizing Provider   ondansetron (ZOFRAN) 4 MG tablet Take 1 tablet by mouth daily as needed for Nausea or Vomiting 21   MARTHA Michael CNP   polyethylene glycol [facility]) 17 GM/SCOOP powder Take 17 g by mouth 2 times daily 21  MARTHA Michael CNP   pravastatin (PRAVACHOL) 20 MG tablet Take 1 tablet by mouth daily  Patient not taking: Reported on 2021   MARTHA Johnson CNP   omeprazole (PRILOSEC) 40 MG delayed release capsule Take 1 capsule by mouth every morning (before breakfast) 21   MARTHA Johnson CNP       Current medications:    No current facility-administered medications for this encounter. Current Outpatient Medications   Medication Sig Dispense Refill    ondansetron (ZOFRAN) 4 MG tablet Take 1 tablet by mouth daily as needed for Nausea or Vomiting 30 tablet 3    polyethylene glycol (MIRALAX) 17 GM/SCOOP powder Take 17 g by mouth 2 times daily 1020 g 3    pravastatin (PRAVACHOL) 20 MG tablet Take 1 tablet by mouth daily (Patient not taking: Reported on 2021) 30 tablet 2    omeprazole (PRILOSEC) 40 MG delayed release capsule Take 1 capsule by mouth every morning (before breakfast) 30 capsule 3       Allergies:     Allergies   Allergen Reactions    Nortriptyline Rash    Paroxetine Shortness Of Breath    Coconut Flavor      Anything with coconut       Problem List:    Patient Active Problem List   Diagnosis Code    Gastroesophageal reflux disease without esophagitis K21.9       Past Medical in the last 72 hours. Coags: No results found for: PROTIME, INR, APTT    HCG (If Applicable): No results found for: PREGTESTUR, PREGSERUM, HCG, HCGQUANT     ABGs: No results found for: PHART, PO2ART, JIE1FUT, FUS5HAR, BEART, G3ZTQPZI     Type & Screen (If Applicable):  No results found for: LABABO, LABRH    Drug/Infectious Status (If Applicable):  No results found for: HIV, HEPCAB    COVID-19 Screening (If Applicable):   Lab Results   Component Value Date    COVID19 NOT DETECTED 09/08/2021           Anesthesia Evaluation    Airway:         Dental:          Pulmonary:   (+) sleep apnea:  current smoker                           Cardiovascular:    (+) hypertension:, hyperlipidemia                  Neuro/Psych:               GI/Hepatic/Renal:   (+) GERD:, liver disease:, bowel prep,           Endo/Other:                     Abdominal:             Vascular:           ROS comment: Clotting disorder .  Other Findings:             Anesthesia Plan        Duey Headings, APRN - CRNA   9/13/2021

## 2021-09-14 ENCOUNTER — HOSPITAL ENCOUNTER (OUTPATIENT)
Age: 31
Setting detail: OUTPATIENT SURGERY
Discharge: HOME | End: 2021-09-14
Attending: INTERNAL MEDICINE | Admitting: INTERNAL MEDICINE
Payer: COMMERCIAL

## 2021-09-14 ENCOUNTER — ANESTHESIA (OUTPATIENT)
Dept: OPERATING ROOM | Age: 31
End: 2021-09-14
Payer: COMMERCIAL

## 2021-09-14 VITALS
OXYGEN SATURATION: 98 % | TEMPERATURE: 97.4 F | HEIGHT: 66 IN | BODY MASS INDEX: 33.75 KG/M2 | SYSTOLIC BLOOD PRESSURE: 105 MMHG | DIASTOLIC BLOOD PRESSURE: 68 MMHG | RESPIRATION RATE: 16 BRPM | HEART RATE: 63 BPM | WEIGHT: 210 LBS

## 2021-09-14 VITALS — OXYGEN SATURATION: 94 % | DIASTOLIC BLOOD PRESSURE: 65 MMHG | SYSTOLIC BLOOD PRESSURE: 96 MMHG

## 2021-09-14 DIAGNOSIS — R11.0 NAUSEA: ICD-10-CM

## 2021-09-14 DIAGNOSIS — K92.1 BLOOD IN STOOL: ICD-10-CM

## 2021-09-14 DIAGNOSIS — R10.11 RUQ PAIN: ICD-10-CM

## 2021-09-14 PROCEDURE — 88305 TISSUE EXAM BY PATHOLOGIST: CPT

## 2021-09-14 PROCEDURE — 2580000003 HC RX 258: Performed by: NURSE PRACTITIONER

## 2021-09-14 PROCEDURE — 7100000011 HC PHASE II RECOVERY - ADDTL 15 MIN: Performed by: INTERNAL MEDICINE

## 2021-09-14 PROCEDURE — 3700000000 HC ANESTHESIA ATTENDED CARE: Performed by: INTERNAL MEDICINE

## 2021-09-14 PROCEDURE — 6360000002 HC RX W HCPCS: Performed by: NURSE ANESTHETIST, CERTIFIED REGISTERED

## 2021-09-14 PROCEDURE — 88342 IMHCHEM/IMCYTCHM 1ST ANTB: CPT

## 2021-09-14 PROCEDURE — 3609027000 HC COLONOSCOPY: Performed by: INTERNAL MEDICINE

## 2021-09-14 PROCEDURE — 43239 EGD BIOPSY SINGLE/MULTIPLE: CPT | Performed by: INTERNAL MEDICINE

## 2021-09-14 PROCEDURE — 45378 DIAGNOSTIC COLONOSCOPY: CPT | Performed by: INTERNAL MEDICINE

## 2021-09-14 PROCEDURE — 7100000010 HC PHASE II RECOVERY - FIRST 15 MIN: Performed by: INTERNAL MEDICINE

## 2021-09-14 PROCEDURE — 2709999900 HC NON-CHARGEABLE SUPPLY: Performed by: INTERNAL MEDICINE

## 2021-09-14 PROCEDURE — 3700000001 HC ADD 15 MINUTES (ANESTHESIA): Performed by: INTERNAL MEDICINE

## 2021-09-14 PROCEDURE — 3609012400 HC EGD TRANSORAL BIOPSY SINGLE/MULTIPLE: Performed by: INTERNAL MEDICINE

## 2021-09-14 RX ORDER — SODIUM CHLORIDE 0.9 % (FLUSH) 0.9 %
5-40 SYRINGE (ML) INJECTION EVERY 12 HOURS SCHEDULED
Status: DISCONTINUED | OUTPATIENT
Start: 2021-09-14 | End: 2021-09-14 | Stop reason: HOSPADM

## 2021-09-14 RX ORDER — PROPOFOL 10 MG/ML
INJECTION, EMULSION INTRAVENOUS PRN
Status: DISCONTINUED | OUTPATIENT
Start: 2021-09-14 | End: 2021-09-14 | Stop reason: SDUPTHER

## 2021-09-14 RX ORDER — SODIUM CHLORIDE, SODIUM LACTATE, POTASSIUM CHLORIDE, CALCIUM CHLORIDE 600; 310; 30; 20 MG/100ML; MG/100ML; MG/100ML; MG/100ML
INJECTION, SOLUTION INTRAVENOUS CONTINUOUS
Status: DISCONTINUED | OUTPATIENT
Start: 2021-09-14 | End: 2021-09-14 | Stop reason: HOSPADM

## 2021-09-14 RX ORDER — SODIUM CHLORIDE 0.9 % (FLUSH) 0.9 %
5-40 SYRINGE (ML) INJECTION PRN
Status: DISCONTINUED | OUTPATIENT
Start: 2021-09-14 | End: 2021-09-14 | Stop reason: HOSPADM

## 2021-09-14 RX ORDER — SODIUM CHLORIDE 9 MG/ML
25 INJECTION, SOLUTION INTRAVENOUS PRN
Status: DISCONTINUED | OUTPATIENT
Start: 2021-09-14 | End: 2021-09-14 | Stop reason: HOSPADM

## 2021-09-14 RX ORDER — LIDOCAINE HYDROCHLORIDE 20 MG/ML
INJECTION, SOLUTION INTRAVENOUS PRN
Status: DISCONTINUED | OUTPATIENT
Start: 2021-09-14 | End: 2021-09-14 | Stop reason: SDUPTHER

## 2021-09-14 RX ADMIN — PROPOFOL 620 MG: 10 INJECTION, EMULSION INTRAVENOUS at 13:24

## 2021-09-14 RX ADMIN — SODIUM CHLORIDE, POTASSIUM CHLORIDE, SODIUM LACTATE AND CALCIUM CHLORIDE: 600; 310; 30; 20 INJECTION, SOLUTION INTRAVENOUS at 13:11

## 2021-09-14 RX ADMIN — LIDOCAINE HYDROCHLORIDE 100 MG: 20 INJECTION, SOLUTION INTRAVENOUS at 13:24

## 2021-09-14 ASSESSMENT — PAIN - FUNCTIONAL ASSESSMENT: PAIN_FUNCTIONAL_ASSESSMENT: 0-10

## 2021-09-14 ASSESSMENT — PAIN SCALES - GENERAL
PAINLEVEL_OUTOF10: 0
PAINLEVEL_OUTOF10: 0

## 2021-09-14 ASSESSMENT — LIFESTYLE VARIABLES: SMOKING_STATUS: 1

## 2021-09-14 NOTE — ANESTHESIA PRE PROCEDURE
Department of Anesthesiology  Preprocedure Note       Name:  Bindu Cardona   Age:  28 y.o.  :  1990                                          MRN:  [id number]         Date:  2021      Surgeon: Familia Frank):  Deann Parker MD    Procedure: Procedure(s):  COLONOSCOPY DIAGNOSTIC  EGD ESOPHAGOGASTRODUODENOSCOPY    Medications prior to admission:   Prior to Admission medications    Medication Sig Start Date End Date Taking? Authorizing Provider   ondansetron (ZOFRAN) 4 MG tablet Take 1 tablet by mouth daily as needed for Nausea or Vomiting 21  Yes Levorn Fabi, APRN - CNP   polyethylene glycol [facility]) 17 GM/SCOOP powder Take 17 g by mouth 2 times daily 21 Yes Levorn Fabi APRN - CNP   pravastatin (PRAVACHOL) 20 MG tablet Take 1 tablet by mouth daily  Patient not taking: Reported on 2021   MARTHA Broussard CNP   omeprazole (PRILOSEC) 40 MG delayed release capsule Take 1 capsule by mouth every morning (before breakfast) 21   MARTHA Broussard CNP       Current medications:    Current Facility-Administered Medications   Medication Dose Route Frequency Provider Last Rate Last Admin    0.9 % sodium chloride infusion  25 mL IntraVENous PRN Levorn Benne, APRN - CNP        lactated ringers infusion   IntraVENous Continuous Levorn Benne, APRN - CNP 75 mL/hr at 21 1311 New Bag at 21 1311    sodium chloride flush 0.9 % injection 5-40 mL  5-40 mL IntraVENous 2 times per day Levorn Benne, APRN - CNP        sodium chloride flush 0.9 % injection 5-40 mL  5-40 mL IntraVENous PRN Levorn Benne, APRN - CNP           Allergies:     Allergies   Allergen Reactions    Nortriptyline Rash    Paroxetine Shortness Of Breath    Coconut Flavor      Anything with coconut       Problem List:    Patient Active Problem List   Diagnosis Code    Gastroesophageal reflux disease without esophagitis K21.9       Past Medical History: Diagnosis Date    Arthritis     Clotting disorder ([address]     Enlarged liver     Hernia, inguinal, left     Hyperlipidemia     Hypertension     Sleep apnea        Past Surgical History:        Procedure Laterality Date    BICEPS TENDON REPAIR      KNEE ARTHROSCOPY Right 2002    [place], [place]       Social History:    Social History     Tobacco Use    Smoking status: Current Every Day Smoker     Packs/day: 1.00     Years: 21.00     Pack years: 21.00     Types: Cigarettes    Smokeless tobacco: Former User     Types: Chew   Substance Use Topics    Alcohol use: No                                Ready to quit: Not Answered  Counseling given: Not Answered      Vital Signs (Current):   Vitals:    09/07/21 1631 09/14/21 1255   BP:  101/77   Pulse:  70   Resp:  16   Temp:  36.4 °C (97.5 °F)   TempSrc:  Temporal   SpO2:  97%   Weight: 210 lb (95.3 kg)    Height: 5' 6\" (1.676 m)                                               BP Readings from Last 3 Encounters:   09/14/21 101/77   08/04/21 124/80   07/01/21 121/78       NPO Status: Time of last liquid consumption: 0100                        Time of last solid consumption: 1100                        Date of last liquid consumption: 09/14/21                        Date of last solid food consumption: 09/13/21    BMI:   Wt Readings from Last 3 Encounters:   09/07/21 210 lb (95.3 kg)   08/04/21 217 lb 3.2 oz (98.5 kg)   07/01/21 222 lb 3.2 oz (100.8 kg)     Body mass index is 33.89 kg/m².     CBC:   Lab Results   Component Value Date    WBC 7.0 07/07/2021    RBC 5.46 07/07/2021    HGB 16.4 07/07/2021    HCT 47.6 07/07/2021    MCV 87.2 07/07/2021    RDW 13.9 07/07/2021     07/07/2021       CMP:   Lab Results   Component Value Date     07/07/2021    K 4.7 07/07/2021     07/07/2021    CO2 23 07/07/2021    BUN 14 07/07/2021    CREATININE 1.0 07/07/2021    GFRAA >60 07/07/2021    AGRATIO 1.9 07/07/2021    LABGLOM >60 07/07/2021 GLUCOSE 103 07/07/2021    PROT 7.3 07/07/2021    CALCIUM 9.6 07/07/2021    BILITOT <0.2 07/07/2021    ALKPHOS 116 07/07/2021    AST 39 07/07/2021    ALT 68 07/07/2021       POC Tests: No results for input(s): POCGLU, POCNA, POCK, POCCL, POCBUN, POCHEMO, POCHCT in the last 72 hours. Coags: No results found for: PROTIME, INR, APTT    HCG (If Applicable): No results found for: PREGTESTUR, PREGSERUM, HCG, HCGQUANT     ABGs: No results found for: PHART, PO2ART, JVW4YTE, NKT7BHE, BEART, G4YPADOG     Type & Screen (If Applicable):  No results found for: LABABO, LABRH    Drug/Infectious Status (If Applicable):  No results found for: HIV, HEPCAB    COVID-19 Screening (If Applicable):   Lab Results   Component Value Date    COVID19 NOT DETECTED 09/08/2021           Anesthesia Evaluation    Airway: Mallampati: II       Mouth opening: > = 3 FB Dental: normal exam         Pulmonary:normal exam    (+) sleep apnea:  current smoker                           Cardiovascular:    (+) hypertension:, hyperlipidemia                  Neuro/Psych:               GI/Hepatic/Renal:   (+) GERD:, liver disease:, bowel prep,           Endo/Other:                     Abdominal:             Vascular:           ROS comment: Clotting disorder . Other Findings:             Anesthesia Plan      MAC     ASA 2       Induction: intravenous. Anesthetic plan and risks discussed with patient.       Plan discussed with surgical team.                  MARTHA Javed - YOSELYN   9/14/2021

## 2021-09-14 NOTE — H&P
HISTORY & PHYSICAL:  Patient's history with special attention to the cardiovascular, pulmonary systems and the current problem was reviewed with the patient immediately prior to the procedure. Medications, allergies, and pertinent laboratory tests were also reviewed at this time. The physical examination,as below, was then performed. Patient assessed to be ASA Class 2. Mallampati Airway Assessment: 2. History of adverse reactions involving sedation/anesthesia. None  Family history of adverse reaction to sedation/anesthesia. None      PHYSICAL EXAMINATION    /77   Pulse 70   Temp 97.5 °F (36.4 °C) (Temporal)   Resp 16   Ht 5' 6\" (1.676 m)   Wt 210 lb (95.3 kg)   SpO2 97%   BMI 33.89 kg/m²     Mouth and Pharynx : Normal without focal findings  Cardiac: Regular rate and rhythm  Pulmonary: Clear bilaterally  Neurological: Normal without focal findings   Abdomen: Soft, non-tender, non-distended     Written informed consent obtained from patient. Risks (including but not limited to perforation, bloating and bleeding,) benefits and alternatives explained and questions answered. Patient verbalized understanding. Based on history and airway assessment patient is an appropriate candidate for  sedation.     Lazarus Kennedy, MD  09/14/21

## 2021-09-14 NOTE — BRIEF OP NOTE
Brief Postoperative Note      Patient: Citlaly Velasquez  YOB: 1990  MRN: [id number]    Date of Procedure: 9/14/2021    Pre-Op Diagnosis: Blood in stool [K92.1], Nausea [R11.0], RUQ pain [R10.11]    Post-Op Diagnosis: Colon, Normal exam. Esophagitis, gastritis. Procedure(s):  COLONOSCOPY DIAGNOSTIC  EGD BIOPSY    Surgeon(s):  Juan J Bush MD    Assistant:  * No surgical staff found *    Anesthesia: Monitor Anesthesia Care    Estimated Blood Loss (mL): Minimal    Complications: None    Specimens:   ID Type Source Tests Collected by Time Destination   A :  Tissue Tissue SURGICAL PATHOLOGY Juan J Bush MD 9/14/2021 1331        Implants:  * No implants in log *      Drains: * No LDAs found *    Findings: As above. FU with Angel Tom.      Electronically signed by Juan J Bush MD on 9/14/2021 at 1:55 PM

## 2021-09-14 NOTE — PROGRESS NOTES
1405-Patient back to room from procedure via stretcher. No pain or needs voiced. Bedside report from [facility]. Wife at bedside, call light in reach. Apple juice given to patient. 1420-VSS. Monitor and IV removed from patient. Patient getting dressed. 1440-Discharge instructions reviewed with wife and patient. Patient discharged home via car with wife.

## 2021-09-14 NOTE — ANESTHESIA POSTPROCEDURE EVALUATION
Department of Anesthesiology  Postprocedure Note    Patient: Shannon Strauss  MRN: [id number]  YOB: 1990  Date of evaluation: 9/14/2021  Time:  1:59 PM     Procedure Summary     Date: 09/14/21 Room / Location: [address]    Anesthesia Start: 5061 Anesthesia Stop: 1653    Procedures:       COLONOSCOPY DIAGNOSTIC (N/A )      EGD BIOPSY (N/A ) Diagnosis:       Blood in stool      Nausea      RUQ pain      (Blood in stool [K92.1], Nausea [R11.0], RUQ pain [R10.11])    Surgeons: Agustin Mckenna MD Responsible Provider: MARTHA Hager CRNA    Anesthesia Type: MAC ASA Status: 2          Anesthesia Type: MAC    Janet Phase I:      Janet Phase II:      Last vitals: Reviewed and per EMR flowsheets.        Anesthesia Post Evaluation    Patient location during evaluation: bedside  Patient participation: complete - patient participated  Level of consciousness: awake and alert  Pain score: 0  Airway patency: patent  Nausea & Vomiting: no nausea and no vomiting  Complications: no  Cardiovascular status: blood pressure returned to baseline  Respiratory status: acceptable  Hydration status: euvolemic

## 2021-09-23 ENCOUNTER — TELEPHONE (OUTPATIENT)
Dept: GASTROENTEROLOGY | Age: 31
End: 2021-09-23

## 2021-09-23 NOTE — TELEPHONE ENCOUNTER
Please call and notify patient that her EGD showed mild esophagitis and gastritis. Her stomach biopsies showed chronic inflammation with reactive changes most likely from acid reflux. There was no evidence of dysplasia or H. Pylori infection. Her colonoscopy showed normal results; small anal fissure that was healed and recommend repeat colonoscopy at age 40.

## 2021-09-24 ENCOUNTER — TELEPHONE (OUTPATIENT)
Dept: GASTROENTEROLOGY | Age: 31
End: 2021-09-24

## 2021-09-24 NOTE — TELEPHONE ENCOUNTER
Attempted to call pt. And inform her of the results of her Cscope. Lm for pt to call back for results.

## 2021-10-02 ENCOUNTER — HOSPITAL ENCOUNTER (EMERGENCY)
Age: 31
Discharge: HOME | End: 2021-10-02
Attending: EMERGENCY MEDICINE
Payer: COMMERCIAL

## 2021-10-02 VITALS
DIASTOLIC BLOOD PRESSURE: 79 MMHG | TEMPERATURE: 96.7 F | SYSTOLIC BLOOD PRESSURE: 117 MMHG | RESPIRATION RATE: 16 BRPM | BODY MASS INDEX: 33.75 KG/M2 | HEART RATE: 60 BPM | OXYGEN SATURATION: 97 % | HEIGHT: 66 IN | WEIGHT: 210 LBS

## 2021-10-02 DIAGNOSIS — S61.112A LACERATION OF LEFT THUMB WITHOUT FOREIGN BODY WITH DAMAGE TO NAIL, INITIAL ENCOUNTER: Primary | ICD-10-CM

## 2021-10-02 PROCEDURE — 6370000000 HC RX 637 (ALT 250 FOR IP): Performed by: EMERGENCY MEDICINE

## 2021-10-02 PROCEDURE — 99283 EMERGENCY DEPT VISIT LOW MDM: CPT

## 2021-10-02 PROCEDURE — 12002 RPR S/N/AX/GEN/TRNK2.6-7.5CM: CPT

## 2021-10-02 PROCEDURE — 2500000003 HC RX 250 WO HCPCS: Performed by: EMERGENCY MEDICINE

## 2021-10-02 RX ORDER — DIAPER,BRIEF,INFANT-TODD,DISP
EACH MISCELLANEOUS ONCE
Status: COMPLETED | OUTPATIENT
Start: 2021-10-02 | End: 2021-10-02

## 2021-10-02 RX ORDER — LIDOCAINE HYDROCHLORIDE 10 MG/ML
5 INJECTION, SOLUTION EPIDURAL; INFILTRATION; INTRACAUDAL; PERINEURAL ONCE
Status: COMPLETED | OUTPATIENT
Start: 2021-10-02 | End: 2021-10-02

## 2021-10-02 RX ORDER — DOXYCYCLINE HYCLATE 100 MG
100 TABLET ORAL 2 TIMES DAILY
Qty: 10 TABLET | Refills: 0 | Status: SHIPPED | OUTPATIENT
Start: 2021-10-02 | End: 2021-10-07

## 2021-10-02 RX ADMIN — LIDOCAINE HYDROCHLORIDE 5 ML: 10 INJECTION, SOLUTION EPIDURAL; INFILTRATION; INTRACAUDAL; PERINEURAL at 18:20

## 2021-10-02 RX ADMIN — BACITRACIN ZINC 1 G: 500 OINTMENT TOPICAL at 18:20

## 2021-10-02 ASSESSMENT — ENCOUNTER SYMPTOMS
ABDOMINAL PAIN: 0
SHORTNESS OF BREATH: 0
SINUS PRESSURE: 0
SORE THROAT: 0
WHEEZING: 0
DIARRHEA: 0
CONSTIPATION: 0
NAUSEA: 0
VOMITING: 0
COUGH: 0
RHINORRHEA: 0

## 2021-10-02 ASSESSMENT — PAIN SCALES - GENERAL: PAINLEVEL_OUTOF10: 9

## 2021-10-02 ASSESSMENT — PAIN DESCRIPTION - LOCATION: LOCATION: HAND

## 2021-10-02 ASSESSMENT — PAIN DESCRIPTION - ORIENTATION: ORIENTATION: LEFT

## 2021-10-02 ASSESSMENT — PAIN DESCRIPTION - PAIN TYPE: TYPE: ACUTE PAIN

## 2021-10-02 NOTE — ED NOTES
Patient verbalizes understanding of discharge instructions. Patient walks out of ED with an upright and steady gait with even and unlabored respirations.       Michelle cM RN  10/02/21 2555

## 2021-10-02 NOTE — ED PROVIDER NOTES
Emergency Department Encounter    Patient: Shyla Swift  MRN: [id number]  : 1990  Date of Evaluation: 10/2/2021  ED Provider:  [id number] [facility],     Triage Chief Complaint:   Laceration (left thumb )    HPI:  Shyla Swift is a 28 y.o. male past medical history as listed below who presents a laceration to his left thumb. Patient was working on his car engine, when a razor blade slipped, and cut his finger. He states he noticed blood immediately, put pressure on it and drove to the ED. His last tetanus was 2 years ago. Patient states that razor blade was clean. Patient's hands are dirty as he was working on his car engine. Denies any other trauma. Denies F/C, CP, SOB, palpitations, N/V, abdominal pain, dysuria, diarrhea and constipatin. ROS:  Review of Systems   Constitutional: Negative for chills and fever. HENT: Negative for congestion, rhinorrhea, sinus pressure and sore throat. Eyes: Negative for visual disturbance. Respiratory: Negative for cough, shortness of breath and wheezing. Cardiovascular: Negative for chest pain and palpitations. Gastrointestinal: Negative for abdominal pain, constipation, diarrhea, nausea and vomiting. Genitourinary: Negative for dysuria, frequency and urgency. Musculoskeletal: Negative for arthralgias and myalgias. Skin: Positive for wound. Negative for rash. Neurological: Negative for dizziness and syncope. Psychiatric/Behavioral: Negative for agitation, behavioral problems and confusion.          Past Medical History:   Diagnosis Date    Arthritis     Clotting disorder ([facility] [address]     Enlarged liver     Hernia, inguinal, left     Hyperlipidemia     Hypertension     Sleep apnea      Past Surgical History:   Procedure Laterality Date    BICEPS TENDON REPAIR      COLONOSCOPY N/A 2021    COLONOSCOPY DIAGNOSTIC performed by Zeb Fowler MD at [address]     [facility] [place], [place]    UPPER GASTROINTESTINAL ENDOSCOPY N/A 9/14/2021    EGD BIOPSY performed by Zeb Fowler MD at [facility]     Family History   Problem Relation Age of Onset    Diabetes Mother     Cancer Maternal Grandmother         Lung    Emphysema Maternal Grandmother      Social History     Socioeconomic History    Marital status:      Spouse name: Not on file    Number of children: Not on file    Years of education: Not on file    Highest education level: Not on file   Occupational History    Not on file   Tobacco Use    Smoking status: Current Every Day Smoker     Packs/day: 1.00     Years: 21.00     Pack years: 21.00     Types: Cigarettes    Smokeless tobacco: Former User     Types: Chew   Vaping Use    Vaping Use: Never used   Substance and Sexual Activity    Alcohol use: No    Drug use: No    Sexual activity: Yes     Partners: Female   Other Topics Concern    Not on file   Social History Narrative    [employer]    Exercise - PT for work         Social Determinants of Health     Financial Resource Strain:     Difficulty of Paying Living Expenses:    Food Insecurity:     Worried About [address] in the Last Year:    [address] in the Last Year:    Transportation Needs:     Lack of Transportation (Medical):      Lack of Transportation (Non-Medical):    Physical Activity:     Days of Exercise per Week:     Minutes of Exercise per Session:    Stress:     Feeling of Stress :    Social Connections:     Frequency of Communication with Friends and Family:     Frequency of Social Gatherings with Friends and Family:     Attends Caodaism Services:     Active Member of Clubs or Organizations:     Attends Club or Organization Meetings:     Marital Status:    Intimate Partner Violence:     Fear of Current or Ex-Partner:     Emotionally Abused:     Physically Abused:     Sexually Abused:      No current facility-administered medications for this encounter. Current Outpatient Medications   Medication Sig Dispense Refill    doxycycline hyclate (VIBRA-TABS) 100 MG tablet Take 1 tablet by mouth 2 times daily for 5 days 10 tablet 0     Allergies   Allergen Reactions    Nortriptyline Rash    Paroxetine Shortness Of Breath    Tylenol [Acetaminophen] Other (See Comments)     Liver issues    Coconut Flavor      Anything with coconut       Nursing Notes Reviewed    Physical Exam:  Triage VS:    ED Triage Vitals [10/02/21 1648]   Enc Vitals Group      BP (!) 158/135      Pulse 88      Resp 15      Temp 96.7 °F (35.9 °C)      Temp Source Infrared      SpO2 97 %      Weight 210 lb (95.3 kg)      Height 5' 6\" (1.676 m)      Head Circumference       Peak Flow       Pain Score       Pain Loc       Pain Edu? Excl. in [address]? Physical Exam  Vitals and nursing note reviewed. Constitutional:       Appearance: Normal appearance. HENT:      Head: Normocephalic and atraumatic. Nose: Nose normal.      Mouth/Throat:      Mouth: Mucous membranes are moist.   Eyes:      Pupils: Pupils are equal, round, and reactive to light. Cardiovascular:      Rate and Rhythm: Normal rate and regular rhythm. Pulses: Normal pulses. Pulmonary:      Effort: Pulmonary effort is normal. No respiratory distress. Breath sounds: Normal breath sounds. No wheezing or rhonchi. Abdominal:      General: Abdomen is flat. Bowel sounds are normal. There is no distension. Palpations: Abdomen is soft. Tenderness: There is no abdominal tenderness. There is no guarding or rebound. Musculoskeletal:         General: Normal range of motion. Skin:     Capillary Refill: Capillary refill takes less than 2 seconds. Comments: 3 cm laceration to the dorsal aspect of the left thumb, V-shaped. There is superficial laceration to the nail, that does not appear to involve the nailbed. Capillary refill is intact.   There does not appear to be any tendinous, ligamentous or muscular damage. Neurological:      Mental Status: He is alert and oriented to person, place, and time. Mental status is at baseline. Psychiatric:         Mood and Affect: Mood normal.           Chart review shows recent radiographs:  No results found. MDM:  Patient seen and examined. Laceration is repaired. Patient's hands were quite dirty from working on his car engine. Laceration was copiously irrigated and washed. Patient's last tetanus was 2 years ago. Given the contamination of laceration, I did provide patient with antibiotic coverage. I did discuss with patient that this was a skin flap laceration, and I am unsure if the skin flap will heal, or it may potentially die. Patient verbalizes understanding of this. He is to follow-up with his primary care provider in 3 to 5 days for evaluation and a wound check. He is to have the lacerations remain for 10 days. I did give patient return precautions, including signs and symptoms of infection and what to look for. He verbalizes understanding. Patient to follow-up with primary care provider, and return to ED if symptoms worsen. All questions are answered, he is in agreement plan of care. Lac Repair    Date/Time: 10/2/2021 6:27 PM  Performed by: Soy Mojica DO  Authorized by: Soy Mojica DO     Consent:     Consent obtained:  Verbal    Consent given by:  Patient    Risks discussed:  Infection, retained foreign body, pain, need for additional repair, poor cosmetic result, nerve damage, tendon damage, poor wound healing and vascular damage    Alternatives discussed:  No treatment  Anesthesia (see MAR for exact dosages):      Anesthesia method:  Local infiltration    Local anesthetic:  Lidocaine 1% w/o epi  Laceration details:     Location:  Finger    Finger location:  L thumb    Length (cm):  3    Depth (mm):  0.5  Repair type:     Repair type:  Simple  Pre-procedure details:     Preparation:  Patient was prepped and draped in usual sterile fashion  Exploration:     Hemostasis achieved with:  Direct pressure    Wound exploration: wound explored through full range of motion and entire depth of wound probed and visualized      Contaminated: yes    Treatment:     Area cleansed with:  Hibiclens and saline    Amount of cleaning:  Extensive    Irrigation solution:  Sterile saline    Irrigation volume:  1000    Irrigation method:  Syringe    Visualized foreign bodies/material removed: no    Skin repair:     Repair method:  Sutures    Suture size:  4-0    Suture material:  Nylon    Number of sutures:  3  Approximation:     Approximation:  Close  Post-procedure details:     Dressing:  Antibiotic ointment and adhesive bandage    Patient tolerance of procedure: Tolerated well, no immediate complications            Clinical Impression:  1. Laceration of left thumb without foreign body with damage to nail, initial encounter      Disposition referral (if applicable):  MARTHA Mason CNP  [address]  [phone]    Schedule an appointment as soon as possible for a visit in 2 days      Jimmya Y Andrea [address]  [phone]  Go to   As needed, If symptoms worsen    Disposition medications (if applicable):  New Prescriptions    DOXYCYCLINE HYCLATE (VIBRA-TABS) 100 MG TABLET    Take 1 tablet by mouth 2 times daily for 5 days       Comment: Please note this report has been produced using speech recognition software and may contain errors related to that system including errors in grammar, punctuation, and spelling, as well as words and phrases that may be inappropriate. Efforts were made to edit the dictations.        [address] [facility]  10/02/21 8221

## 2021-10-04 ENCOUNTER — TELEPHONE (OUTPATIENT)
Dept: GASTROENTEROLOGY | Age: 31
End: 2021-10-04

## 2021-10-04 NOTE — TELEPHONE ENCOUNTER
Called pt. And informed him of the results from his EGD and Cscope. Pt. Asked what an anal fissure was and I informed him it was a small tear in the tissue but it healed itself naturally. Pt. Stated understanding and denied further questions.

## 2021-10-06 ENCOUNTER — OFFICE VISIT (OUTPATIENT)
Dept: INTERNAL MEDICINE CLINIC | Age: 31
End: 2021-10-06
Payer: COMMERCIAL

## 2021-10-06 VITALS
OXYGEN SATURATION: 98 % | HEART RATE: 76 BPM | HEIGHT: 66 IN | BODY MASS INDEX: 35.03 KG/M2 | SYSTOLIC BLOOD PRESSURE: 118 MMHG | DIASTOLIC BLOOD PRESSURE: 72 MMHG | WEIGHT: 218 LBS | RESPIRATION RATE: 20 BRPM

## 2021-10-06 DIAGNOSIS — S61.112D LACERATION OF LEFT THUMB WITH DAMAGE TO NAIL, FOREIGN BODY PRESENCE UNSPECIFIED, SUBSEQUENT ENCOUNTER: Primary | ICD-10-CM

## 2021-10-06 DIAGNOSIS — M79.645 FINGER PAIN, LEFT: ICD-10-CM

## 2021-10-06 DIAGNOSIS — E78.2 MIXED HYPERLIPIDEMIA: ICD-10-CM

## 2021-10-06 PROCEDURE — 99214 OFFICE O/P EST MOD 30 MIN: CPT | Performed by: NURSE PRACTITIONER

## 2021-10-06 PROCEDURE — 4004F PT TOBACCO SCREEN RCVD TLK: CPT | Performed by: NURSE PRACTITIONER

## 2021-10-06 PROCEDURE — G8427 DOCREV CUR MEDS BY ELIG CLIN: HCPCS | Performed by: NURSE PRACTITIONER

## 2021-10-06 PROCEDURE — G8417 CALC BMI ABV UP PARAM F/U: HCPCS | Performed by: NURSE PRACTITIONER

## 2021-10-06 PROCEDURE — G8484 FLU IMMUNIZE NO ADMIN: HCPCS | Performed by: NURSE PRACTITIONER

## 2021-10-06 ASSESSMENT — ENCOUNTER SYMPTOMS
CHEST TIGHTNESS: 0
ABDOMINAL DISTENTION: 0
WHEEZING: 0
ABDOMINAL PAIN: 0
COUGH: 0
SINUS PRESSURE: 0
CONSTIPATION: 0
DIARRHEA: 0
NAUSEA: 0
EYES NEGATIVE: 1
SHORTNESS OF BREATH: 0
SINUS PAIN: 0
SORE THROAT: 0
COLOR CHANGE: 0

## 2022-02-07 ENCOUNTER — OFFICE VISIT (OUTPATIENT)
Dept: INTERNAL MEDICINE CLINIC | Age: 32
End: 2022-02-07
Payer: COMMERCIAL

## 2022-02-07 VITALS
BODY MASS INDEX: 34.46 KG/M2 | DIASTOLIC BLOOD PRESSURE: 71 MMHG | WEIGHT: 214.4 LBS | SYSTOLIC BLOOD PRESSURE: 122 MMHG | HEIGHT: 66 IN | HEART RATE: 82 BPM | OXYGEN SATURATION: 98 % | RESPIRATION RATE: 20 BRPM

## 2022-02-07 DIAGNOSIS — F17.200 TOBACCO DEPENDENCE: ICD-10-CM

## 2022-02-07 DIAGNOSIS — E78.2 MIXED HYPERLIPIDEMIA: ICD-10-CM

## 2022-02-07 DIAGNOSIS — Z00.00 ENCOUNTER FOR PREVENTIVE HEALTH EXAMINATION: Primary | ICD-10-CM

## 2022-02-07 DIAGNOSIS — G47.10 HYPERSOMNOLENCE: ICD-10-CM

## 2022-02-07 DIAGNOSIS — G47.00 INSOMNIA, UNSPECIFIED TYPE: ICD-10-CM

## 2022-02-07 DIAGNOSIS — Z00.00 ENCOUNTER FOR PREVENTIVE HEALTH EXAMINATION: ICD-10-CM

## 2022-02-07 LAB
A/G RATIO: 1.7 (ref 1.1–2.2)
ALBUMIN SERPL-MCNC: 4.7 G/DL (ref 3.4–5)
ALP BLD-CCNC: 134 U/L (ref 40–129)
ALT SERPL-CCNC: 54 U/L (ref 10–40)
ANION GAP SERPL CALCULATED.3IONS-SCNC: 12 MMOL/L (ref 3–16)
AST SERPL-CCNC: 28 U/L (ref 15–37)
BILIRUB SERPL-MCNC: <0.2 MG/DL (ref 0–1)
BUN BLDV-MCNC: 14 MG/DL (ref 7–20)
CALCIUM SERPL-MCNC: 9.7 MG/DL (ref 8.3–10.6)
CHLORIDE BLD-SCNC: 103 MMOL/L (ref 99–110)
CHOLESTEROL, FASTING: 248 MG/DL (ref 0–199)
CO2: 25 MMOL/L (ref 21–32)
CREAT SERPL-MCNC: 1 MG/DL (ref 0.9–1.3)
FOLATE: 6.94 NG/ML (ref 4.78–24.2)
GFR AFRICAN AMERICAN: >60
GFR NON-AFRICAN AMERICAN: >60
GLUCOSE BLD-MCNC: 109 MG/DL (ref 70–99)
HDLC SERPL-MCNC: 32 MG/DL (ref 40–60)
LDL CHOLESTEROL CALCULATED: ABNORMAL MG/DL
LDL CHOLESTEROL DIRECT: 168 MG/DL
POTASSIUM SERPL-SCNC: 5.2 MMOL/L (ref 3.5–5.1)
SODIUM BLD-SCNC: 140 MMOL/L (ref 136–145)
TOTAL PROTEIN: 7.4 G/DL (ref 6.4–8.2)
TRIGLYCERIDE, FASTING: 454 MG/DL (ref 0–150)
TSH REFLEX: 1.65 UIU/ML (ref 0.27–4.2)
VITAMIN B-12: 379 PG/ML (ref 211–911)
VITAMIN D 25-HYDROXY: 13.5 NG/ML
VLDLC SERPL CALC-MCNC: ABNORMAL MG/DL

## 2022-02-07 PROCEDURE — 36415 COLL VENOUS BLD VENIPUNCTURE: CPT | Performed by: NURSE PRACTITIONER

## 2022-02-07 PROCEDURE — G8484 FLU IMMUNIZE NO ADMIN: HCPCS | Performed by: NURSE PRACTITIONER

## 2022-02-07 PROCEDURE — 99395 PREV VISIT EST AGE 18-39: CPT | Performed by: NURSE PRACTITIONER

## 2022-02-07 RX ORDER — TRAZODONE HYDROCHLORIDE 50 MG/1
50 TABLET ORAL NIGHTLY
Qty: 30 TABLET | Refills: 2 | Status: SHIPPED | OUTPATIENT
Start: 2022-02-07

## 2022-02-07 ASSESSMENT — ENCOUNTER SYMPTOMS
ABDOMINAL PAIN: 0
SORE THROAT: 0
CHEST TIGHTNESS: 0
SINUS PRESSURE: 0
CONSTIPATION: 0
DIARRHEA: 0
SHORTNESS OF BREATH: 0
EYES NEGATIVE: 1
SINUS PAIN: 0
NAUSEA: 0
COLOR CHANGE: 0
WHEEZING: 0
ABDOMINAL DISTENTION: 0
COUGH: 0

## 2022-02-07 ASSESSMENT — PATIENT HEALTH QUESTIONNAIRE - PHQ9
1. LITTLE INTEREST OR PLEASURE IN DOING THINGS: 0
SUM OF ALL RESPONSES TO PHQ9 QUESTIONS 1 & 2: 0
SUM OF ALL RESPONSES TO PHQ QUESTIONS 1-9: 0
SUM OF ALL RESPONSES TO PHQ QUESTIONS 1-9: 0
2. FEELING DOWN, DEPRESSED OR HOPELESS: 0
SUM OF ALL RESPONSES TO PHQ QUESTIONS 1-9: 0
SUM OF ALL RESPONSES TO PHQ QUESTIONS 1-9: 0
DEPRESSION UNABLE TO ASSESS: FUNCTIONAL CAPACITY MOTIVATION LIMITS ACCURACY

## 2022-02-07 NOTE — PROGRESS NOTES
2022    Sonali Herring (:  1990) is a 28 y.o. male, here for a preventive medicine evaluation. Patient Active Problem List   Diagnosis    Gastroesophageal reflux disease without esophagitis    Blood in stool     Most recent labs showed a significant elevated in his LDL cholesterol, also with moderate elevation in his triglycerides to 374. Deferred statin therapy. Has been working on diet/exercise modifications. Lab Results   Component Value Date    LDLCALC see below 2021    LDLDIRECT 171 (H) 2021     States over the last several months he has been having issues with staying asleep. States he will get 5-6 hours of sleep per night, however wakes almost every hour. Has been limiting caffeine and improving diet with no change. Has been using melatonin, benadryl nyquil, z-qyil with no improvement. Does report his wife reports loud snoring and intermittent periods of apnea. He has never been formally tested. Unfortunately does smokes 1 ppd on average. Has filed wellbutrin, chantix, patches, gum. Review of Systems   Constitutional: Negative for activity change, appetite change, fatigue and fever. HENT: Negative for congestion, sinus pressure, sinus pain and sore throat. Eyes: Negative. Respiratory: Negative for cough, chest tightness, shortness of breath and wheezing. Cardiovascular: Negative for chest pain and palpitations. Gastrointestinal: Negative for abdominal distention, abdominal pain, constipation, diarrhea and nausea. Genitourinary: Negative for difficulty urinating, dysuria, flank pain, frequency and hematuria. Musculoskeletal: Negative for arthralgias, gait problem, joint swelling and myalgias. Skin: Negative for color change and rash. Neurological: Negative for dizziness, light-headedness and numbness. Hematological: Negative. Psychiatric/Behavioral: Positive for sleep disturbance. Prior to Visit Medications    Medication Sig Taking? Authorizing Provider   traZODone (DESYREL) 50 MG tablet Take 1 tablet by mouth nightly Yes Benja Valenzuela APRN - CNP        Allergies   Allergen Reactions    Nortriptyline Rash    Paroxetine Shortness Of Breath    Tylenol [Acetaminophen] Other (See Comments)     Liver issues    Coconut Flavor      Anything with coconut       Past Medical History:   Diagnosis Date    Arthritis     Clotting disorder ([address]     Enlarged liver     Hernia, inguinal, left     Hyperlipidemia     Hypertension     Sleep apnea        Past Surgical History:   Procedure Laterality Date    BICEPS TENDON REPAIR      COLONOSCOPY N/A 9/14/2021    COLONOSCOPY DIAGNOSTIC performed by Jenae Bond MD at [address] [place], [place]    UPPER GASTROINTESTINAL ENDOSCOPY N/A 9/14/2021    EGD BIOPSY performed by Jenae Bond MD at [facility] History     Socioeconomic History    Marital status:      Spouse name: Not on file    Number of children: Not on file    Years of education: Not on file    Highest education level: Not on file   Occupational History    Not on file   Tobacco Use    Smoking status: Current Every Day Smoker     Packs/day: 1.00     Years: 21.00     Pack years: 21.00     Types: Cigarettes    Smokeless tobacco: Former User     Types: [address] Use    Vaping Use: Never used   Substance and Sexual Activity    Alcohol use: No    Drug use: No    Sexual activity: Yes     Partners: Female   Other Topics Concern    Not on file   Social History Narrative     [employer]    Exercise - PT for work         Social Determinants of Health     Financial Resource Strain:     Difficulty of Paying Living Expenses: Not on file   Food Insecurity:     Worried About [address] in the Last Year: Not on file    [address] in the Last Year: Not on file   Transportation Needs:     Lack of Transportation (Medical):  Not on file    Lack of Transportation (Non-Medical): Not on file   Physical Activity:     Days of Exercise per Week: Not on file    Minutes of Exercise per Session: Not on file   Stress:     Feeling of Stress : Not on file   Social Connections:     Frequency of Communication with Friends and Family: Not on file    Frequency of Social Gatherings with Friends and Family: Not on file    Attends Holiness Services: Not on file    Active Member of [address] [place] [address] [employer] or Organizations: Not on file    Attends Club or Organization Meetings: Not on file    Marital Status: Not on file   Intimate Partner Violence:     Fear of Current or Ex-Partner: Not on file    Emotionally Abused: Not on file    Physically Abused: Not on file    Sexually Abused: Not on file   Housing Stability:     Unable to Pay for Housing in the Last Year: Not on file    Number of [place] in the Last Year: Not on file    Unstable Housing in the Last Year: Not on file        Family History   Problem Relation Age of Onset    Diabetes Mother     Cancer Maternal Grandmother         Lung    Emphysema Maternal Grandmother        ADVANCE DIRECTIVE: N, <no information>    Vitals:    02/07/22 1404   BP: 122/71   Pulse: 82   Resp: 20   SpO2: 98%   Weight: 214 lb 6.4 oz (97.3 kg)   Height: 5' 6\" (1.676 m)     Estimated body mass index is 34.61 kg/m² as calculated from the following:    Height as of this encounter: 5' 6\" (1.676 m). Weight as of this encounter: 214 lb 6.4 oz (97.3 kg). Physical Exam  Constitutional:       General: He is not in acute distress. Appearance: He is well-developed. He is not diaphoretic. HENT:      Head: Normocephalic and atraumatic. Neck:      Thyroid: No thyromegaly. Cardiovascular:      Rate and Rhythm: Normal rate and regular rhythm. Heart sounds: Normal heart sounds. No murmur heard. Pulmonary:      Effort: Pulmonary effort is normal.      Breath sounds: Normal breath sounds.    Abdominal:      General: Bowel sounds are normal. There is no distension. Palpations: Abdomen is soft. Tenderness: There is no abdominal tenderness. Musculoskeletal:         General: Normal range of motion. Lymphadenopathy:      Cervical: No cervical adenopathy. Skin:     General: Skin is warm and dry. Capillary Refill: Capillary refill takes less than 2 seconds. Findings: No rash. Neurological:      Mental Status: He is alert and oriented to person, place, and time. GCS: GCS eye subscore is 4. GCS verbal subscore is 5. GCS motor subscore is 6. Coordination: Coordination normal.   Psychiatric:         Behavior: Behavior normal.         Thought Content: Thought content normal.         No flowsheet data found. Lab Results   Component Value Date    CHOL 222 11/10/2014    CHOL 207 09/13/2013    CHOLFAST 251 07/07/2021    TRIG 326 11/10/2014    TRIG 208 09/13/2013    TRIGLYCFAST 374 07/07/2021    HDL 41 07/07/2021    HDL 32 11/10/2014    HDL 37 09/13/2013    LDLCALC see below 07/07/2021    LDLCALC see below 11/10/2014    [address] 09/13/2013    GLUCOSE 103 07/07/2021       The ASCVD Risk score (Pierre Pratt et al., 2013) failed to calculate for the following reasons: The 2013 ASCVD risk score is only valid for ages 36 to 78    Immunization History   Administered Date(s) Administered    Tdap (Boostrix, Adacel) 10/16/2013       Health Maintenance   Topic Date Due    COVID-19 Vaccine (1) Never done    Pneumococcal 0-64 years Vaccine (1 of 2 - PPSV23) Never done    Flu vaccine (1) Never done    Varicella vaccine (1 of 2 - 2-dose childhood series) 02/28/2022 (Originally 3/19/1991)    Depression Screen  07/01/2022    DTaP/Tdap/Td vaccine (7 - Td or Tdap) 10/16/2023    Hepatitis B vaccine  Completed    Hib vaccine  Completed    Hepatitis C screen  Completed    HIV screen  Completed    Hepatitis A vaccine  Aged Out    Meningococcal (ACWY) vaccine  Aged Out          ASSESSMENT/PLAN:  1.  Encounter for preventive health examination - no acute concerns on exam; BP and all vitals at goal; check preventative labs as below and will address any concerns. -     CBC Auto Differential; Future  -     Comprehensive Metabolic Panel; Future  -     Lipid, Fasting; Future  -     TSH with Reflex; Future    2. Mixed hyperlipidemia- deferred statin previously; recheck lipid panel today and if no improvement will likely discuss restarting statin therapy. 3. Insomnia, unspecified type- start trazodone, can start with 1/2 tablet QHS, then advance to full tablet if no improvement. -     traZODone (DESYREL) 50 MG tablet; Take 1 tablet by mouth nightly, Disp-30 tablet, R-2Normal    4. Tobacco dependence Patient counseled in length on smoking cessation including benefits of quitting and health risks of continuing to smoke including but not limited to lung cancer, COPD, risk of coronary artery disease. Patient verbalized understanding today, is not ready to quit. 5. Hypersomnolence- strong suspicion for DIYA and will ask pulm to eval pt in consultation for likely sleep study; also screen TSH, b12/folate/Vit D, H&H.   -     VITAMIN B12 & FOLATE; Future  -     VITAMIN D 25 HYDROXY; Future  -     CBC Auto Differential; Future  -     Comprehensive Metabolic Panel; Future  -     TSH with Reflex; Future  -     [facility] - Breann Bonilla CNP, Pulmonology, [address]    Course of treatment, including any medications, possible imaging, referrals, and follow ups discussed with patient. All risks and benefits and possible side effects discussed with patient who agrees to plan of care and verbalizes understanding. All labs and imaging reviewed. Return in about 3 months (around 5/7/2022). An electronic signature was used to authenticate this note.     --MARTHA Hannah - CNP on 2/7/2022 at 4:12 PM

## 2022-02-08 DIAGNOSIS — R73.01 IFG (IMPAIRED FASTING GLUCOSE): Primary | ICD-10-CM

## 2022-02-08 LAB
BASOPHILS ABSOLUTE: 0 K/UL (ref 0–0.2)
BASOPHILS RELATIVE PERCENT: 0.3 %
EOSINOPHILS ABSOLUTE: 0.1 K/UL (ref 0–0.6)
EOSINOPHILS RELATIVE PERCENT: 1.4 %
HCT VFR BLD CALC: 48.3 % (ref 40.5–52.5)
HEMOGLOBIN: 16.4 G/DL (ref 13.5–17.5)
LYMPHOCYTES ABSOLUTE: 2 K/UL (ref 1–5.1)
LYMPHOCYTES RELATIVE PERCENT: 24.4 %
MCH RBC QN AUTO: 29.4 PG (ref 26–34)
MCHC RBC AUTO-ENTMCNC: 33.8 G/DL (ref 31–36)
MCV RBC AUTO: 86.8 FL (ref 80–100)
MONOCYTES ABSOLUTE: 0.6 K/UL (ref 0–1.3)
MONOCYTES RELATIVE PERCENT: 7.1 %
NEUTROPHILS ABSOLUTE: 5.4 K/UL (ref 1.7–7.7)
NEUTROPHILS RELATIVE PERCENT: 66.8 %
PDW BLD-RTO: 14 % (ref 12.4–15.4)
PLATELET # BLD: 334 K/UL (ref 135–450)
PMV BLD AUTO: 8.2 FL (ref 5–10.5)
RBC # BLD: 5.57 M/UL (ref 4.2–5.9)
WBC # BLD: 8.1 K/UL (ref 4–11)

## 2022-02-08 RX ORDER — PRAVASTATIN SODIUM 20 MG
20 TABLET ORAL DAILY
Qty: 30 TABLET | Refills: 2 | Status: SHIPPED | OUTPATIENT
Start: 2022-02-08

## 2022-02-08 RX ORDER — CHOLECALCIFEROL (VITAMIN D3) 50 MCG
2000 TABLET ORAL DAILY
Qty: 30 TABLET | Refills: 2 | Status: SHIPPED | OUTPATIENT
Start: 2022-02-08

## 2022-05-13 ENCOUNTER — OFFICE VISIT (OUTPATIENT)
Dept: INTERNAL MEDICINE CLINIC | Age: 32
End: 2022-05-13
Payer: COMMERCIAL

## 2022-05-13 ENCOUNTER — HOSPITAL ENCOUNTER (OUTPATIENT)
Age: 32
Discharge: HOME | End: 2022-05-13
Payer: COMMERCIAL

## 2022-05-13 ENCOUNTER — HOSPITAL ENCOUNTER (OUTPATIENT)
Dept: GENERAL RADIOLOGY | Age: 32
Discharge: HOME | End: 2022-05-13
Payer: COMMERCIAL

## 2022-05-13 VITALS
DIASTOLIC BLOOD PRESSURE: 72 MMHG | HEART RATE: 83 BPM | WEIGHT: 215.6 LBS | BODY MASS INDEX: 34.8 KG/M2 | SYSTOLIC BLOOD PRESSURE: 118 MMHG | OXYGEN SATURATION: 96 %

## 2022-05-13 DIAGNOSIS — R07.81 RIB PAIN ON RIGHT SIDE: Primary | ICD-10-CM

## 2022-05-13 DIAGNOSIS — R07.81 RIB PAIN ON RIGHT SIDE: ICD-10-CM

## 2022-05-13 PROCEDURE — G8417 CALC BMI ABV UP PARAM F/U: HCPCS | Performed by: NURSE PRACTITIONER

## 2022-05-13 PROCEDURE — G8427 DOCREV CUR MEDS BY ELIG CLIN: HCPCS | Performed by: NURSE PRACTITIONER

## 2022-05-13 PROCEDURE — 71046 X-RAY EXAM CHEST 2 VIEWS: CPT

## 2022-05-13 PROCEDURE — 4004F PT TOBACCO SCREEN RCVD TLK: CPT | Performed by: NURSE PRACTITIONER

## 2022-05-13 PROCEDURE — 99213 OFFICE O/P EST LOW 20 MIN: CPT | Performed by: NURSE PRACTITIONER

## 2022-05-13 RX ORDER — IBUPROFEN 800 MG/1
800 TABLET ORAL
Qty: 90 TABLET | Refills: 0 | Status: SHIPPED | OUTPATIENT
Start: 2022-05-13

## 2022-05-13 RX ORDER — PREDNISONE 10 MG/1
TABLET ORAL
Qty: 20 TABLET | Refills: 0 | Status: SHIPPED | OUTPATIENT
Start: 2022-05-13

## 2022-05-13 ASSESSMENT — ENCOUNTER SYMPTOMS
WHEEZING: 0
COLOR CHANGE: 0
ABDOMINAL DISTENTION: 0
EYES NEGATIVE: 1
DIARRHEA: 0
CHEST TIGHTNESS: 0
CONSTIPATION: 0
ABDOMINAL PAIN: 0
SORE THROAT: 0
SINUS PRESSURE: 0
SHORTNESS OF BREATH: 0
SINUS PAIN: 0
NAUSEA: 0
COUGH: 0

## 2022-05-13 NOTE — PROGRESS NOTES
Bindu Cardona  1990  05/13/22    Chief Complaint   Patient presents with    Chest Pain       SUBJECTIVE:      Benigno Coronado states that the other night he was standing outside smoking when he suddenly developed a \"bad coughing fit\" and felt a \"pop\" in his right lateral chest wall . Went to the [facility] ER and waited for 2 hours then left. Denies any significant change in regards to shortness of breath, however, does experience significant pain with deep inspiration. Review of Systems   Constitutional: Negative for activity change, appetite change, fatigue and fever. HENT: Negative for congestion, sinus pressure, sinus pain and sore throat. Eyes: Negative. Respiratory: Negative for cough, chest tightness, shortness of breath and wheezing. Cardiovascular: Negative for chest pain and palpitations. Gastrointestinal: Negative for abdominal distention, abdominal pain, constipation, diarrhea and nausea. Genitourinary: Negative for difficulty urinating, dysuria, flank pain, frequency and hematuria. Musculoskeletal: Positive for arthralgias (right lateral rib cage, pain). Negative for gait problem, joint swelling and myalgias. Skin: Negative for color change and rash. Neurological: Negative for dizziness, light-headedness and numbness. Hematological: Negative. Psychiatric/Behavioral: Negative. OBJECTIVE:    /72 (Site: Left Upper Arm, Position: Sitting, Cuff Size: Medium Adult)   Pulse 83   Wt 215 lb 9.6 oz (97.8 kg)   SpO2 96%   BMI 34.80 kg/m²     Physical Exam  Constitutional:       General: He is not in acute distress. Appearance: He is well-developed. He is not diaphoretic. HENT:      Head: Normocephalic and atraumatic. Neck:      Thyroid: No thyromegaly. Cardiovascular:      Rate and Rhythm: Normal rate and regular rhythm. Heart sounds: Normal heart sounds. No murmur heard.       Pulmonary:      Effort: Pulmonary effort is normal. Breath sounds: Normal breath sounds. Abdominal:      General: Bowel sounds are normal. There is no distension. Palpations: Abdomen is soft. Tenderness: There is no abdominal tenderness. Musculoskeletal:         General: Normal range of motion. Lymphadenopathy:      Cervical: No cervical adenopathy. Skin:     General: Skin is warm and dry. Capillary Refill: Capillary refill takes less than 2 seconds. Neurological:      Mental Status: He is alert and oriented to person, place, and time. GCS: GCS eye subscore is 4. GCS verbal subscore is 5. GCS motor subscore is 6. Coordination: Coordination normal.   Psychiatric:         Behavior: Behavior normal.         Thought Content: Thought content normal.         ASSESSMENT:    1. Rib pain on right side        PLAN:    Cody Atkins was seen today for chest pain. Diagnoses and all orders for this visit:    Rib pain on right side-given injury and ongoing pain, will obtain XR to r/o acute osseous issue. Pulm exam benign with no obvious concerns for pneumonia or pneumothorax, however, will obtain CXR for confirmation. Continue Ibuprofen PRN, Prednisone taper and practice deep breathing several times daily for pneumonia prevention. -     XR CHEST STANDARD (2 VW); Future  -     ibuprofen (ADVIL;MOTRIN) 800 MG tablet; Take 1 tablet by mouth 3 times daily (with meals)  -     predniSONE (DELTASONE) 10 MG tablet; Take 4 tablets daily for 2 days, then 3 tablets for 2 days, 2 tablets for 2 days, then 1 tablet for 2 days        No flowsheet data found. Return if symptoms worsen or fail to improve. Makayla note this reports has been produced speech recognition software and may contain errors related to that system including errors in grammar, punctuation, and spelling, as well as words and phrases that may be appropriate. If there are any questions or concerns please feel free to contact the dictating provider for clarification.

## 2022-12-28 ENCOUNTER — OFFICE VISIT (OUTPATIENT)
Dept: INTERNAL MEDICINE CLINIC | Age: 32
End: 2022-12-28
Payer: COMMERCIAL

## 2022-12-28 VITALS
BODY MASS INDEX: 34.61 KG/M2 | HEART RATE: 68 BPM | OXYGEN SATURATION: 97 % | DIASTOLIC BLOOD PRESSURE: 80 MMHG | RESPIRATION RATE: 14 BRPM | SYSTOLIC BLOOD PRESSURE: 116 MMHG | WEIGHT: 214.4 LBS

## 2022-12-28 DIAGNOSIS — M26.621 ARTHRALGIA OF RIGHT TEMPOROMANDIBULAR JOINT: Primary | ICD-10-CM

## 2022-12-28 DIAGNOSIS — G47.00 INSOMNIA, UNSPECIFIED TYPE: ICD-10-CM

## 2022-12-28 PROCEDURE — 99214 OFFICE O/P EST MOD 30 MIN: CPT | Performed by: NURSE PRACTITIONER

## 2022-12-28 PROCEDURE — G8427 DOCREV CUR MEDS BY ELIG CLIN: HCPCS | Performed by: NURSE PRACTITIONER

## 2022-12-28 PROCEDURE — 4004F PT TOBACCO SCREEN RCVD TLK: CPT | Performed by: NURSE PRACTITIONER

## 2022-12-28 PROCEDURE — G8484 FLU IMMUNIZE NO ADMIN: HCPCS | Performed by: NURSE PRACTITIONER

## 2022-12-28 PROCEDURE — G8417 CALC BMI ABV UP PARAM F/U: HCPCS | Performed by: NURSE PRACTITIONER

## 2022-12-28 RX ORDER — MELOXICAM 15 MG/1
15 TABLET ORAL DAILY PRN
Qty: 30 TABLET | Refills: 0 | Status: SHIPPED | OUTPATIENT
Start: 2022-12-28

## 2022-12-28 RX ORDER — PREDNISONE 10 MG/1
TABLET ORAL
Qty: 20 TABLET | Refills: 0 | Status: SHIPPED | OUTPATIENT
Start: 2022-12-28

## 2022-12-28 RX ORDER — CYCLOBENZAPRINE HCL 10 MG
5 TABLET ORAL NIGHTLY PRN
Qty: 30 TABLET | Refills: 0 | Status: SHIPPED | OUTPATIENT
Start: 2022-12-28 | End: 2023-01-27

## 2022-12-28 RX ORDER — DOXEPIN HYDROCHLORIDE 25 MG/1
25 CAPSULE ORAL NIGHTLY
Qty: 30 CAPSULE | Refills: 2 | Status: SHIPPED | OUTPATIENT
Start: 2022-12-28

## 2022-12-28 ASSESSMENT — ENCOUNTER SYMPTOMS
WHEEZING: 0
SINUS PRESSURE: 0
SHORTNESS OF BREATH: 0
NAUSEA: 0
CONSTIPATION: 0
EYES NEGATIVE: 1
CHEST TIGHTNESS: 0
ABDOMINAL PAIN: 0
COUGH: 0
SINUS PAIN: 0
ABDOMINAL DISTENTION: 0
COLOR CHANGE: 0
SORE THROAT: 0
DIARRHEA: 0

## 2022-12-28 NOTE — PROGRESS NOTES
Mia Gupta  1990  12/28/22    Chief Complaint   Patient presents with    Temporomandibular Joint Pain       SUBJECTIVE:      Jose Doe presents to the office this morning stating that he has been having worsening jaw pain on the right side near his TMJ joint. States he has chronic issues with pain in this area to some extent, but it has been much worse in the last 2-3 weeks. Having significant pain with mastication and opening mouth to talk. Has also been requiring 2-3 Trazodones nightly to completely fall and stay asleep, however, this is causing a lot of drowsiness throughout the night (works third shift). Seroquel was not a good fit for him in the past.     Review of Systems   Constitutional:  Negative for activity change, appetite change, fatigue and fever. HENT:  Negative for congestion, sinus pressure, sinus pain and sore throat. Eyes: Negative. Respiratory:  Negative for cough, chest tightness, shortness of breath and wheezing. Cardiovascular:  Negative for chest pain and palpitations. Gastrointestinal:  Negative for abdominal distention, abdominal pain, constipation, diarrhea and nausea. Genitourinary:  Negative for difficulty urinating, dysuria, flank pain, frequency and hematuria. Musculoskeletal:  Positive for arthralgias (jaw, right side). Negative for gait problem, joint swelling and myalgias. Skin:  Negative for color change and rash. Neurological:  Negative for dizziness, light-headedness and numbness. Hematological: Negative. Psychiatric/Behavioral: Negative. OBJECTIVE:    /80 (Site: Left Upper Arm, Position: Sitting, Cuff Size: Large Adult)   Pulse 68   Resp 14   Wt 214 lb 6.4 oz (97.3 kg)   SpO2 97%   BMI 34.61 kg/m²     Physical Exam  Constitutional:       General: He is not in acute distress. Appearance: He is well-developed. He is not diaphoretic. HENT:      Head: Normocephalic and atraumatic. Neck:      Thyroid: No thyromegaly. Cardiovascular:      Rate and Rhythm: Normal rate and regular rhythm. Heart sounds: Normal heart sounds. No murmur heard. Pulmonary:      Effort: Pulmonary effort is normal.      Breath sounds: Normal breath sounds. Abdominal:      General: Bowel sounds are normal. There is no distension. Palpations: Abdomen is soft. Tenderness: There is no abdominal tenderness. Musculoskeletal:         General: Normal range of motion. Lymphadenopathy:      Cervical: No cervical adenopathy. Skin:     General: Skin is warm and dry. Capillary Refill: Capillary refill takes less than 2 seconds. Neurological:      Mental Status: He is alert and oriented to person, place, and time. GCS: GCS eye subscore is 4. GCS verbal subscore is 5. GCS motor subscore is 6. Coordination: Coordination normal.   Psychiatric:         Behavior: Behavior normal.         Thought Content: Thought content normal.       ASSESSMENT:    1. Arthralgia of right temporomandibular joint    2. Insomnia, unspecified type        PLAN:    Trinidad So was seen today for temporomandibular joint pain. Diagnoses and all orders for this visit:    Arthralgia of right temporomandibular joint-exam is consistent with likely TMJ disorder. Start prednisone taper, daily Mobic and nightly as needed cyclobenzaprine as noted below. If no improvement over the next 1 to 2 weeks, he is encouraged to follow-up with his dental provider. -     cyclobenzaprine (FLEXERIL) 10 MG tablet; Take 0.5 tablets by mouth nightly as needed for Muscle spasms  -     meloxicam (MOBIC) 15 MG tablet; Take 1 tablet by mouth daily as needed for Pain  -     predniSONE (DELTASONE) 10 MG tablet; Take 4 tablets daily for 2 days, then 3 tablets for 2 days, 2 tablets for 2 days, then 1 tablet for 2 days    Insomnia, unspecified type-trazodone requiring high doses to be effective but also causing excessive daytime sleepiness.   We will transition to doxepin as noted below and patient asked to call in 2 to 3 weeks with update.  -     doxepin (SINEQUAN) 25 MG capsule; Take 1 capsule by mouth nightly    Course of treatment, including any medications, possible imaging, referrals, and follow ups discussed with patient. All risks and benefits and possible side effects discussed with patient who agrees to plan of care and verbalizes understanding. All labs and imaging reviewed. No flowsheet data found. Return in about 3 months (around 3/28/2023). Makayla note this reports has been produced speech recognition software and may contain errors related to that system including errors in grammar, punctuation, and spelling, as well as words and phrases that may be appropriate. If there are any questions or concerns please feel free to contact the dictating provider for clarification.

## 2023-02-05 ENCOUNTER — HOSPITAL ENCOUNTER (EMERGENCY)
Age: 33
Discharge: HOME | End: 2023-02-05
Attending: EMERGENCY MEDICINE
Payer: COMMERCIAL

## 2023-02-05 ENCOUNTER — APPOINTMENT (OUTPATIENT)
Dept: GENERAL RADIOLOGY | Age: 33
End: 2023-02-05
Payer: COMMERCIAL

## 2023-02-05 VITALS
RESPIRATION RATE: 16 BRPM | HEART RATE: 91 BPM | OXYGEN SATURATION: 98 % | HEIGHT: 66 IN | TEMPERATURE: 98.3 F | SYSTOLIC BLOOD PRESSURE: 140 MMHG | DIASTOLIC BLOOD PRESSURE: 84 MMHG | BODY MASS INDEX: 33.27 KG/M2 | WEIGHT: 207 LBS

## 2023-02-05 DIAGNOSIS — S46.212A TEAR OF LEFT BICEPS MUSCLE, INITIAL ENCOUNTER: ICD-10-CM

## 2023-02-05 DIAGNOSIS — S43.402A SPRAIN OF LEFT SHOULDER, UNSPECIFIED SHOULDER SPRAIN TYPE, INITIAL ENCOUNTER: Primary | ICD-10-CM

## 2023-02-05 DIAGNOSIS — G47.00 INSOMNIA, UNSPECIFIED TYPE: ICD-10-CM

## 2023-02-05 PROCEDURE — 73060 X-RAY EXAM OF HUMERUS: CPT

## 2023-02-05 PROCEDURE — 99283 EMERGENCY DEPT VISIT LOW MDM: CPT

## 2023-02-05 PROCEDURE — 73030 X-RAY EXAM OF SHOULDER: CPT

## 2023-02-05 RX ORDER — HYDROCODONE BITARTRATE AND ACETAMINOPHEN 5; 325 MG/1; MG/1
1 TABLET ORAL ONCE
Status: DISCONTINUED | OUTPATIENT
Start: 2023-02-05 | End: 2023-02-05 | Stop reason: HOSPADM

## 2023-02-05 RX ORDER — METHYLPREDNISOLONE 4 MG/1
TABLET ORAL
Qty: 1 KIT | Refills: 0 | Status: SHIPPED | OUTPATIENT
Start: 2023-02-05

## 2023-02-05 RX ORDER — HYDROCODONE BITARTRATE AND ACETAMINOPHEN 5; 325 MG/1; MG/1
1 TABLET ORAL EVERY 6 HOURS PRN
Qty: 10 TABLET | Refills: 0 | Status: SHIPPED | OUTPATIENT
Start: 2023-02-05 | End: 2023-02-05 | Stop reason: ALTCHOICE

## 2023-02-05 RX ORDER — OXYCODONE HYDROCHLORIDE AND ACETAMINOPHEN 5; 325 MG/1; MG/1
1 TABLET ORAL EVERY 6 HOURS PRN
Qty: 12 TABLET | Refills: 0 | Status: SHIPPED | OUTPATIENT
Start: 2023-02-05 | End: 2023-02-08

## 2023-02-05 ASSESSMENT — PAIN DESCRIPTION - DESCRIPTORS
DESCRIPTORS: BURNING
DESCRIPTORS: SHARP

## 2023-02-05 ASSESSMENT — PAIN DESCRIPTION - LOCATION
LOCATION: SHOULDER
LOCATION: SHOULDER

## 2023-02-05 ASSESSMENT — PAIN DESCRIPTION - ORIENTATION
ORIENTATION: LEFT
ORIENTATION: LEFT

## 2023-02-05 ASSESSMENT — PAIN SCALES - GENERAL: PAINLEVEL_OUTOF10: 8

## 2023-02-05 ASSESSMENT — PAIN DESCRIPTION - PAIN TYPE: TYPE: ACUTE PAIN

## 2023-02-05 ASSESSMENT — PAIN DESCRIPTION - FREQUENCY: FREQUENCY: CONTINUOUS

## 2023-02-05 ASSESSMENT — PAIN DESCRIPTION - ONSET: ONSET: SUDDEN

## 2023-02-05 ASSESSMENT — PAIN - FUNCTIONAL ASSESSMENT: PAIN_FUNCTIONAL_ASSESSMENT: 0-10

## 2023-02-05 NOTE — ED NOTES
[place] drug screen complete.  Please see [facility]# [id number]     Unique Araya RN  02/05/23 042

## 2023-02-05 NOTE — ED PROVIDER NOTES
[facility] had called saying that they are out of their Norco supply right now. Patient requested something be ordered for him and lieu of this. Switched to [employer]. Sent to Korey N Gino Ahumada.   We did call [facility] to confirm this was the case and cancel his prescription for Michael Engel MD  02/05/23 0730

## 2023-02-05 NOTE — ED NOTES
Pt wants more time before he urinates for drug test to make sure he has enough urine. He is drinking water.        Maicol Diehl RN  02/05/23 0785

## 2023-02-06 RX ORDER — TRAZODONE HYDROCHLORIDE 50 MG/1
50 TABLET ORAL NIGHTLY
Qty: 30 TABLET | Refills: 2 | Status: SHIPPED | OUTPATIENT
Start: 2023-02-06

## 2023-03-30 DIAGNOSIS — G47.00 INSOMNIA, UNSPECIFIED TYPE: ICD-10-CM

## 2023-03-30 RX ORDER — TRAZODONE HYDROCHLORIDE 50 MG/1
50 TABLET ORAL NIGHTLY
Qty: 30 TABLET | Refills: 2 | Status: SHIPPED | OUTPATIENT
Start: 2023-03-30

## 2023-03-30 RX ORDER — DOXEPIN HYDROCHLORIDE 25 MG/1
CAPSULE ORAL
Qty: 30 CAPSULE | Refills: 2 | OUTPATIENT
Start: 2023-03-30

## 2023-04-05 ENCOUNTER — OFFICE VISIT (OUTPATIENT)
Dept: INTERNAL MEDICINE CLINIC | Age: 33
End: 2023-04-05
Payer: COMMERCIAL

## 2023-04-05 VITALS
BODY MASS INDEX: 33.27 KG/M2 | OXYGEN SATURATION: 97 % | WEIGHT: 207 LBS | DIASTOLIC BLOOD PRESSURE: 72 MMHG | SYSTOLIC BLOOD PRESSURE: 114 MMHG | HEART RATE: 78 BPM | HEIGHT: 66 IN | RESPIRATION RATE: 18 BRPM

## 2023-04-05 DIAGNOSIS — R14.2 BELCHING: ICD-10-CM

## 2023-04-05 DIAGNOSIS — G89.29 CHRONIC PAIN OF MULTIPLE JOINTS: ICD-10-CM

## 2023-04-05 DIAGNOSIS — Z00.00 ENCOUNTER FOR PREVENTIVE HEALTH EXAMINATION: Primary | ICD-10-CM

## 2023-04-05 DIAGNOSIS — E55.9 VITAMIN D DEFICIENCY: ICD-10-CM

## 2023-04-05 DIAGNOSIS — M25.50 CHRONIC PAIN OF MULTIPLE JOINTS: ICD-10-CM

## 2023-04-05 DIAGNOSIS — F17.200 TOBACCO DEPENDENCE: ICD-10-CM

## 2023-04-05 PROCEDURE — 99395 PREV VISIT EST AGE 18-39: CPT | Performed by: NURSE PRACTITIONER

## 2023-04-05 RX ORDER — PANTOPRAZOLE SODIUM 40 MG/1
40 TABLET, DELAYED RELEASE ORAL
Qty: 30 TABLET | Refills: 2 | Status: SHIPPED | OUTPATIENT
Start: 2023-04-05

## 2023-04-05 RX ORDER — NICOTINE 21 MG/24HR
1 PATCH, TRANSDERMAL 24 HOURS TRANSDERMAL EVERY 24 HOURS
Qty: 30 PATCH | Refills: 2 | Status: SHIPPED | OUTPATIENT
Start: 2023-04-05 | End: 2024-04-04

## 2023-04-05 RX ORDER — PREDNISONE 10 MG/1
TABLET ORAL
Qty: 20 TABLET | Refills: 0 | Status: SHIPPED | OUTPATIENT
Start: 2023-04-05

## 2023-04-05 RX ORDER — SEMAGLUTIDE 0.5 MG/.5ML
INJECTION, SOLUTION SUBCUTANEOUS
COMMUNITY
Start: 2023-03-07

## 2023-04-05 RX ORDER — MELOXICAM 15 MG/1
15 TABLET ORAL DAILY PRN
Qty: 30 TABLET | Refills: 0 | Status: SHIPPED | OUTPATIENT
Start: 2023-04-05

## 2023-04-05 SDOH — ECONOMIC STABILITY: FOOD INSECURITY: WITHIN THE PAST 12 MONTHS, YOU WORRIED THAT YOUR FOOD WOULD RUN OUT BEFORE YOU GOT MONEY TO BUY MORE.: NEVER TRUE

## 2023-04-05 SDOH — ECONOMIC STABILITY: INCOME INSECURITY: HOW HARD IS IT FOR YOU TO PAY FOR THE VERY BASICS LIKE FOOD, HOUSING, MEDICAL CARE, AND HEATING?: NOT HARD AT ALL

## 2023-04-05 SDOH — ECONOMIC STABILITY: HOUSING INSECURITY
IN THE LAST 12 MONTHS, WAS THERE A TIME WHEN YOU DID NOT HAVE A STEADY PLACE TO SLEEP OR SLEPT IN A SHELTER (INCLUDING NOW)?: NO

## 2023-04-05 SDOH — ECONOMIC STABILITY: FOOD INSECURITY: WITHIN THE PAST 12 MONTHS, THE FOOD YOU BOUGHT JUST DIDN'T LAST AND YOU DIDN'T HAVE MONEY TO GET MORE.: NEVER TRUE

## 2023-04-05 ASSESSMENT — PATIENT HEALTH QUESTIONNAIRE - PHQ9
SUM OF ALL RESPONSES TO PHQ QUESTIONS 1-9: 0
SUM OF ALL RESPONSES TO PHQ9 QUESTIONS 1 & 2: 0
DEPRESSION UNABLE TO ASSESS: FUNCTIONAL CAPACITY MOTIVATION LIMITS ACCURACY
1. LITTLE INTEREST OR PLEASURE IN DOING THINGS: 0
SUM OF ALL RESPONSES TO PHQ QUESTIONS 1-9: 0
2. FEELING DOWN, DEPRESSED OR HOPELESS: 0
SUM OF ALL RESPONSES TO PHQ QUESTIONS 1-9: 0
SUM OF ALL RESPONSES TO PHQ QUESTIONS 1-9: 0

## 2023-04-05 ASSESSMENT — ENCOUNTER SYMPTOMS
SINUS PAIN: 0
SHORTNESS OF BREATH: 0
ABDOMINAL DISTENTION: 0
CHEST TIGHTNESS: 0
DIARRHEA: 0
SINUS PRESSURE: 0
COLOR CHANGE: 0
EYES NEGATIVE: 1
WHEEZING: 0
SORE THROAT: 0
NAUSEA: 0
ABDOMINAL PAIN: 0
CONSTIPATION: 0
COUGH: 0

## 2023-04-05 NOTE — PROGRESS NOTES
[address] see below 02/07/2022 02:49 PM    [address] see below 07/07/2021 03:42 PM    [address] see below 11/10/2014 09:00 AM    GLUCOSE 109 02/07/2022 02:49 PM       The ASCVD Risk score (Jordyn SMART, et al., 2019) failed to calculate for the following reasons: The 2019 ASCVD risk score is only valid for ages 36 to 78    Immunization History   Administered Date(s) Administered    COVID-19, MODERNA BLUE border, Primary or Immunocompromised, (age 12y+), IM, 100 mcg/0.5mL 01/13/2021, 02/10/2021    TDaP, ADACEL (age 10y-63y), Becka Fanning (age 10y+), IM, 0.5mL 10/16/2013       Health Maintenance   Topic Date Due    Pneumococcal 0-64 years Vaccine (1 - PCV) Never done    COVID-19 Vaccine (3 - Booster for Moderna series) 04/07/2021    Depression Screen  02/07/2023    Varicella vaccine (1 of 2 - 2-dose childhood series) 04/26/2023 (Originally 3/19/1991)    Flu vaccine (Season Ended) 08/01/2023    DTaP/Tdap/Td vaccine (8 - Td or Tdap) 07/16/2032    Hib vaccine  Completed    Hepatitis C screen  Completed    HIV screen  Completed    Hepatitis A vaccine  Aged Out    Meningococcal (ACWY) vaccine  Aged Out    Lipids  Discontinued    Hepatitis B vaccine  Discontinued       Assessment & Plan   Encounter for preventive health examination - no acute concerns on exam; BP and all vitals at goal; check preventative labs as below and will address any concerns. -     CBC with Auto Differential; Future  -     Comprehensive Metabolic Panel; Future  -     Lipid, Fasting; Future  -     TSH; Future    Belching-symptoms strongly concerning for possible bacterial overgrowth, specifically H. pylori. We will check breath test today and after completion of test also start Protonix for GI protection. If test is positive, will need to initiate appropriate antibiotic therapy.  -     H. Pylori Breath Test, Adult; Future  -     pantoprazole (PROTONIX) 40 MG tablet;  Take 1 tablet by mouth every morning (before breakfast), Disp-30 tablet, R-2Normal    Chronic

## 2023-04-27 DIAGNOSIS — R14.2 BELCHING: ICD-10-CM

## 2023-04-28 RX ORDER — PANTOPRAZOLE SODIUM 40 MG/1
TABLET, DELAYED RELEASE ORAL
Qty: 30 TABLET | Refills: 2 | Status: SHIPPED | OUTPATIENT
Start: 2023-04-28

## 2023-08-05 DIAGNOSIS — G47.00 INSOMNIA, UNSPECIFIED TYPE: ICD-10-CM

## 2023-08-07 RX ORDER — DOXEPIN HYDROCHLORIDE 25 MG/1
CAPSULE ORAL
Qty: 30 CAPSULE | Refills: 2 | Status: SHIPPED | OUTPATIENT
Start: 2023-08-07

## 2023-10-18 ENCOUNTER — TELEMEDICINE (OUTPATIENT)
Dept: INTERNAL MEDICINE CLINIC | Age: 33
End: 2023-10-18
Payer: COMMERCIAL

## 2023-10-18 DIAGNOSIS — F43.21 ADJUSTMENT DISORDER WITH DEPRESSED MOOD: Primary | ICD-10-CM

## 2023-10-18 PROCEDURE — 99213 OFFICE O/P EST LOW 20 MIN: CPT | Performed by: NURSE PRACTITIONER

## 2023-10-18 PROCEDURE — G8427 DOCREV CUR MEDS BY ELIG CLIN: HCPCS | Performed by: NURSE PRACTITIONER

## 2023-10-18 RX ORDER — DULOXETIN HYDROCHLORIDE 30 MG/1
30 CAPSULE, DELAYED RELEASE ORAL DAILY
Qty: 30 CAPSULE | Refills: 2 | Status: SHIPPED | OUTPATIENT
Start: 2023-10-18

## 2023-10-18 ASSESSMENT — ENCOUNTER SYMPTOMS
COLOR CHANGE: 0
SORE THROAT: 0
NAUSEA: 0
WHEEZING: 0
CONSTIPATION: 0
SINUS PAIN: 0
CHEST TIGHTNESS: 0
DIARRHEA: 0
EYES NEGATIVE: 1
COUGH: 0
SHORTNESS OF BREATH: 0
ABDOMINAL PAIN: 0
ABDOMINAL DISTENTION: 0
SINUS PRESSURE: 0

## 2023-10-18 NOTE — PROGRESS NOTES
10/18/2023    TELEHEALTH EVALUATION -- Audio/Visual    HPI:    Wiliam De La Cruz (:  1990) has requested an audio/video evaluation for the following concern(s):    Suffered injury to finger of right hand, same as where has previous prosthesis. Injured it over the summer and has has ongoing pain and persistent loss of ROM. After extensive follow up, eventually decided on amputation. 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 This has made his mood much more dysphoric and he would like to discuss getting back on Cymbalta and he did well on this as a teenager. Denies any SI/HI. Review of Systems   Constitutional:  Negative for activity change, appetite change, fatigue and fever. HENT:  Negative for congestion, sinus pressure, sinus pain and sore throat. Eyes: Negative. Respiratory:  Negative for cough, chest tightness, shortness of breath and wheezing. Cardiovascular:  Negative for chest pain and palpitations. Gastrointestinal:  Negative for abdominal distention, abdominal pain, constipation, diarrhea and nausea. Genitourinary:  Negative for difficulty urinating, dysuria, flank pain, frequency and hematuria. Musculoskeletal:  Negative for arthralgias, gait problem, joint swelling and myalgias. Skin:  Negative for color change and rash. Neurological:  Negative for dizziness, light-headedness and numbness. Hematological: Negative. Psychiatric/Behavioral:  Positive for dysphoric mood. Prior to Visit Medications    Medication Sig Taking?  Authorizing Provider   DULoxetine (CYMBALTA) 30 MG extended release capsule Take 1 capsule by mouth daily Yes MARTHA Murry CNP   doxepin (SINEQUAN) 25 MG capsule TAKE 1 CAPSULE BY MOUTH EVERY DAY AT NIGHT Yes MARTHA Murry CNP   pantoprazole (PROTONIX) 40 MG tablet TAKE 1 TABLET BY MOUTH EVERY DAY BEFORE BREAKFAST Yes MARTHA Murry CNP   meloxicam (MOBIC) 15 MG

## 2023-11-06 ENCOUNTER — HOSPITAL ENCOUNTER (EMERGENCY)
Age: 33
Discharge: ANOTHER HOSPITAL | End: 2023-11-06
Attending: EMERGENCY MEDICINE
Payer: COMMERCIAL

## 2023-11-06 ENCOUNTER — APPOINTMENT (OUTPATIENT)
Dept: GENERAL RADIOLOGY | Age: 33
End: 2023-11-06
Payer: COMMERCIAL

## 2023-11-06 VITALS
RESPIRATION RATE: 18 BRPM | SYSTOLIC BLOOD PRESSURE: 132 MMHG | TEMPERATURE: 98 F | OXYGEN SATURATION: 96 % | DIASTOLIC BLOOD PRESSURE: 81 MMHG | HEART RATE: 82 BPM

## 2023-11-06 DIAGNOSIS — S72.402B TYPE I OR II OPEN FRACTURE OF DISTAL END OF LEFT FEMUR, UNSPECIFIED FRACTURE MORPHOLOGY, INITIAL ENCOUNTER (HCC): ICD-10-CM
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
## 2023-11-06 PROCEDURE — 96375 TX/PRO/DX INJ NEW DRUG ADDON: CPT

## 2023-11-06 PROCEDURE — 6360000002 HC RX W HCPCS

## 2023-11-06 PROCEDURE — 96365 THER/PROPH/DIAG IV INF INIT: CPT

## 2023-11-06 PROCEDURE — 6360000002 HC RX W HCPCS: Performed by: EMERGENCY MEDICINE

## 2023-11-06 PROCEDURE — 99285 EMERGENCY DEPT VISIT HI MDM: CPT

## 2023-11-06 PROCEDURE — 73552 X-RAY EXAM OF FEMUR 2/>: CPT

## 2023-11-06 PROCEDURE — 96376 TX/PRO/DX INJ SAME DRUG ADON: CPT

## 2023-11-06 PROCEDURE — 2580000003 HC RX 258: Performed by: EMERGENCY MEDICINE

## 2023-11-06 RX ORDER — FENTANYL CITRATE 50 UG/ML
100 INJECTION, SOLUTION INTRAMUSCULAR; INTRAVENOUS ONCE
Status: COMPLETED | OUTPATIENT
Start: 2023-11-06 | End: 2023-11-06

## 2023-11-06 RX ORDER — FENTANYL CITRATE 50 UG/ML
INJECTION, SOLUTION INTRAMUSCULAR; INTRAVENOUS
Status: COMPLETED
Start: 2023-11-06 | End: 2023-11-06

## 2023-11-06 RX ORDER — ONDANSETRON 2 MG/ML
4 INJECTION INTRAMUSCULAR; INTRAVENOUS EVERY 6 HOURS PRN
Status: DISCONTINUED | OUTPATIENT
Start: 2023-11-06 | End: 2023-11-06 | Stop reason: HOSPADM

## 2023-11-06 RX ADMIN — CEFAZOLIN 2000 MG: 2 INJECTION, POWDER, FOR SOLUTION INTRAMUSCULAR; INTRAVENOUS at 17:30

## 2023-11-06 RX ADMIN — FENTANYL CITRATE 100 MCG: 50 INJECTION INTRAMUSCULAR; INTRAVENOUS at 18:11

## 2023-11-06 RX ADMIN — FENTANYL CITRATE 100 MCG: 50 INJECTION, SOLUTION INTRAMUSCULAR; INTRAVENOUS at 17:25

## 2023-11-06 RX ADMIN — FENTANYL CITRATE 100 MCG: 50 INJECTION INTRAMUSCULAR; INTRAVENOUS at 17:25

## 2023-11-06 RX ADMIN — ONDANSETRON 4 MG: 2 INJECTION INTRAMUSCULAR; INTRAVENOUS at 17:30

## 2023-11-06 RX ADMIN — HYDROMORPHONE HYDROCHLORIDE 1 MG: 1 INJECTION, SOLUTION INTRAMUSCULAR; INTRAVENOUS; SUBCUTANEOUS at 18:20

## 2023-11-06 ASSESSMENT — PAIN SCALES - GENERAL
PAINLEVEL_OUTOF10: 10
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
## 2023-11-06 NOTE — ED PROVIDER NOTES
doxepin (SINEQUAN) 25 MG capsule TAKE 1 CAPSULE BY MOUTH EVERY DAY AT NIGHT 30 capsule 2    pantoprazole (PROTONIX) 40 MG tablet TAKE 1 TABLET BY MOUTH EVERY DAY BEFORE BREAKFAST 30 tablet 2    WEGOVY 0.5 MG/0.5ML SOAJ SC injection PLEASE SEE ATTACHED FOR DETAILED DIRECTIONS (Patient not taking: Reported on 10/18/2023)      meloxicam (MOBIC) 15 MG tablet Take 1 tablet by mouth daily as needed for Pain 30 tablet 0    nicotine (NICODERM CQ) 21 MG/24HR Place 1 patch onto the skin every 24 hours 30 patch 2    ibuprofen (ADVIL;MOTRIN) 800 MG tablet Take 1 tablet by mouth 3 times daily (with meals) 90 tablet 0     Allergies   Allergen Reactions    Nortriptyline Rash    Paroxetine Shortness Of Breath    Tylenol [Acetaminophen] Other (See Comments)     Liver issues    Coconut Flavor      Anything with coconut       Nursing Notes Reviewed    Physical Exam:  ED Triage Vitals   Enc Vitals Group      BP       Pulse       Resp       Temp       Temp src       SpO2       Weight       Height       Head Circumference       Peak Flow       Pain Score       Pain Loc       Pain Edu? Excl. in [address]? My pulse ox interpretation is - normal    General appearance:  No acute distress. Sitting comfortably in bed. Skin:  Warm. Dry. Eye:  Extraocular movements intact. Ears, nose, mouth and throat:  Oral mucosa moist   Extremity:  No swelling. Normal ROM other than the affected left leg. There is a nickel sized wound to the anterior left distal thigh as well as to the left lateral superior popliteal fossa. There is scant amount of venous oozing and marrow output from the wound. There is no rapidly expanding hematoma. Thigh is otherwise soft. Easily palpable DP and PT pulses to the left foot with left foot well-perfused. Sensation intact globally light touch. Wiggles toes without difficulty. Heart:  Strong and symmetric peripheral pulses. Extremities are well perfused. Abdomen:  Non-distended.   Respiratory:

## 2023-11-06 NOTE — ED NOTES
Pt to [facility] via care flight ground. Dr. Krysta Rodriguez with verbal orders to give 1mg dilaudid, provider aware of 3 100mcg fentanyl.        Brenda Anderson RN  11/06/23 1181

## 2023-11-13 ENCOUNTER — TELEPHONE (OUTPATIENT)
Dept: INTERNAL MEDICINE CLINIC | Age: 33
End: 2023-11-13
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
## 2023-11-14 DIAGNOSIS — F43.21 ADJUSTMENT DISORDER WITH DEPRESSED MOOD: ICD-10-CM

## 2023-11-14 RX ORDER — DULOXETIN HYDROCHLORIDE 30 MG/1
30 CAPSULE, DELAYED RELEASE ORAL DAILY
Qty: 90 CAPSULE | Refills: 0 | Status: SHIPPED | OUTPATIENT
Start: 2023-11-14

## 2023-12-01 ENCOUNTER — HOSPITAL ENCOUNTER (EMERGENCY)
Age: 33
Discharge: ANOTHER HOSPITAL | End: 2023-12-01
Attending: EMERGENCY MEDICINE
Payer: COMMERCIAL

## 2023-12-01 ENCOUNTER — APPOINTMENT (OUTPATIENT)
Dept: GENERAL RADIOLOGY | Age: 33
End: 2023-12-01
Payer: COMMERCIAL

## 2023-12-01 VITALS
TEMPERATURE: 98 F | HEART RATE: 79 BPM | SYSTOLIC BLOOD PRESSURE: 127 MMHG | BODY MASS INDEX: 32.14 KG/M2 | RESPIRATION RATE: 14 BRPM | HEIGHT: 66 IN | DIASTOLIC BLOOD PRESSURE: 85 MMHG | OXYGEN SATURATION: 98 % | WEIGHT: 200 LBS

## 2023-12-01 DIAGNOSIS — M25.562 ACUTE PAIN OF LEFT KNEE: Primary | ICD-10-CM

## 2023-12-01 PROCEDURE — 99285 EMERGENCY DEPT VISIT HI MDM: CPT

## 2023-12-01 PROCEDURE — 96375 TX/PRO/DX INJ NEW DRUG ADDON: CPT

## 2023-12-01 PROCEDURE — 73502 X-RAY EXAM HIP UNI 2-3 VIEWS: CPT

## 2023-12-01 PROCEDURE — 6360000002 HC RX W HCPCS: Performed by: EMERGENCY MEDICINE

## 2023-12-01 PROCEDURE — 96374 THER/PROPH/DIAG INJ IV PUSH: CPT

## 2023-12-01 PROCEDURE — 73562 X-RAY EXAM OF KNEE 3: CPT

## 2023-12-01 RX ORDER — MORPHINE SULFATE 4 MG/ML
4 INJECTION, SOLUTION INTRAMUSCULAR; INTRAVENOUS ONCE
Status: COMPLETED | OUTPATIENT
Start: 2023-12-01 | End: 2023-12-01

## 2023-12-01 RX ORDER — DIAZEPAM 5 MG/ML
5 INJECTION, SOLUTION INTRAMUSCULAR; INTRAVENOUS ONCE
Status: COMPLETED | OUTPATIENT
Start: 2023-12-01 | End: 2023-12-01

## 2023-12-01 RX ORDER — KETOROLAC TROMETHAMINE 15 MG/ML
15 INJECTION, SOLUTION INTRAMUSCULAR; INTRAVENOUS ONCE
Status: COMPLETED | OUTPATIENT
Start: 2023-12-01 | End: 2023-12-01

## 2023-12-01 RX ADMIN — MORPHINE SULFATE 4 MG: 4 INJECTION, SOLUTION INTRAMUSCULAR; INTRAVENOUS at 20:41

## 2023-12-01 RX ADMIN — HYDROMORPHONE HYDROCHLORIDE 1 MG: 1 INJECTION, SOLUTION INTRAMUSCULAR; INTRAVENOUS; SUBCUTANEOUS at 21:47

## 2023-12-01 RX ADMIN — KETOROLAC TROMETHAMINE 15 MG: 15 INJECTION, SOLUTION INTRAMUSCULAR; INTRAVENOUS at 20:41

## 2023-12-01 RX ADMIN — DIAZEPAM 5 MG: 5 INJECTION, SOLUTION INTRAMUSCULAR; INTRAVENOUS at 22:57

## 2023-12-01 ASSESSMENT — LIFESTYLE VARIABLES
HOW MANY STANDARD DRINKS CONTAINING ALCOHOL DO YOU HAVE ON A TYPICAL DAY: PATIENT DOES NOT DRINK
HOW OFTEN DO YOU HAVE A DRINK CONTAINING ALCOHOL: NEVER

## 2023-12-01 ASSESSMENT — PAIN SCALES - GENERAL
PAINLEVEL_OUTOF10: 9
PAINLEVEL_OUTOF10: 7

## 2023-12-01 ASSESSMENT — PAIN DESCRIPTION - ORIENTATION
ORIENTATION: LEFT
ORIENTATION: LEFT

## 2023-12-01 ASSESSMENT — PAIN DESCRIPTION - LOCATION
LOCATION: LEG
LOCATION: KNEE;LEG;HIP

## 2023-12-01 ASSESSMENT — PAIN - FUNCTIONAL ASSESSMENT: PAIN_FUNCTIONAL_ASSESSMENT: 0-10
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
## 2023-12-25 DIAGNOSIS — G47.00 INSOMNIA, UNSPECIFIED TYPE: ICD-10-CM

## 2023-12-27 RX ORDER — TRAZODONE HYDROCHLORIDE 50 MG/1
50 TABLET ORAL NIGHTLY
Qty: 30 TABLET | Refills: 2 | Status: SHIPPED | OUTPATIENT
Start: 2023-12-27

## 2024-01-17 ENCOUNTER — TELEMEDICINE (OUTPATIENT)
Dept: INTERNAL MEDICINE CLINIC | Age: 34
End: 2024-01-17
Payer: COMMERCIAL

## 2024-01-17 DIAGNOSIS — J01.00 ACUTE NON-RECURRENT MAXILLARY SINUSITIS: Primary | ICD-10-CM

## 2024-01-17 DIAGNOSIS — S72.92XS CLOSED FRACTURE OF LEFT FEMUR, UNSPECIFIED FRACTURE MORPHOLOGY, UNSPECIFIED PORTION OF FEMUR, SEQUELA: ICD-10-CM

## 2024-01-17 PROCEDURE — G8427 DOCREV CUR MEDS BY ELIG CLIN: HCPCS | Performed by: NURSE PRACTITIONER

## 2024-01-17 PROCEDURE — G8484 FLU IMMUNIZE NO ADMIN: HCPCS | Performed by: NURSE PRACTITIONER

## 2024-01-17 PROCEDURE — 4004F PT TOBACCO SCREEN RCVD TLK: CPT | Performed by: NURSE PRACTITIONER

## 2024-01-17 PROCEDURE — 99214 OFFICE O/P EST MOD 30 MIN: CPT | Performed by: NURSE PRACTITIONER

## 2024-01-17 PROCEDURE — G8417 CALC BMI ABV UP PARAM F/U: HCPCS | Performed by: NURSE PRACTITIONER

## 2024-01-17 RX ORDER — GABAPENTIN 300 MG/1
CAPSULE ORAL
COMMUNITY
Start: 2023-11-08 | End: 2024-01-17 | Stop reason: SDUPTHER

## 2024-01-17 RX ORDER — GABAPENTIN 300 MG/1
CAPSULE ORAL
Qty: 90 CAPSULE | Refills: 1 | Status: SHIPPED | OUTPATIENT
Start: 2024-01-17 | End: 2024-02-17

## 2024-01-17 RX ORDER — METHOCARBAMOL 750 MG/1
750 TABLET, FILM COATED ORAL 4 TIMES DAILY PRN
COMMUNITY
Start: 2023-11-08 | End: 2024-01-17 | Stop reason: SDUPTHER

## 2024-01-17 RX ORDER — PREDNISONE 10 MG/1
TABLET ORAL
Qty: 20 TABLET | Refills: 0 | Status: SHIPPED | OUTPATIENT
Start: 2024-01-17

## 2024-01-17 RX ORDER — METHOCARBAMOL 750 MG/1
750 TABLET, FILM COATED ORAL 4 TIMES DAILY PRN
Qty: 30 TABLET | Refills: 1 | Status: SHIPPED | OUTPATIENT
Start: 2024-01-17

## 2024-01-17 RX ORDER — AMOXICILLIN AND CLAVULANATE POTASSIUM 875; 125 MG/1; MG/1
1 TABLET, FILM COATED ORAL 2 TIMES DAILY
Qty: 14 TABLET | Refills: 0 | Status: SHIPPED | OUTPATIENT
Start: 2024-01-17 | End: 2024-01-24

## 2024-01-17 ASSESSMENT — ENCOUNTER SYMPTOMS
EYES NEGATIVE: 1
CONSTIPATION: 0
COLOR CHANGE: 0
SHORTNESS OF BREATH: 0
SINUS PAIN: 1
RHINORRHEA: 1
SINUS PRESSURE: 1
ABDOMINAL DISTENTION: 0
CHEST TIGHTNESS: 0
SORE THROAT: 0
WHEEZING: 0
DIARRHEA: 0
ABDOMINAL PAIN: 0
COUGH: 0
NAUSEA: 0

## 2024-01-17 ASSESSMENT — PATIENT HEALTH QUESTIONNAIRE - PHQ9
SUM OF ALL RESPONSES TO PHQ QUESTIONS 1-9: 0
SUM OF ALL RESPONSES TO PHQ9 QUESTIONS 1 & 2: 0
DEPRESSION UNABLE TO ASSESS: FUNCTIONAL CAPACITY MOTIVATION LIMITS ACCURACY
1. LITTLE INTEREST OR PLEASURE IN DOING THINGS: 0
2. FEELING DOWN, DEPRESSED OR HOPELESS: 0
SUM OF ALL RESPONSES TO PHQ QUESTIONS 1-9: 0
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
## 2024-04-23 ENCOUNTER — HOSPITAL ENCOUNTER (EMERGENCY)
Age: 34
Discharge: HOME | End: 2024-04-24
Payer: COMMERCIAL

## 2024-04-23 DIAGNOSIS — S72.352S: Primary | ICD-10-CM

## 2024-04-23 DIAGNOSIS — Z46.89 ENCOUNTER FOR MANAGEMENT OF WOUND VAC: ICD-10-CM

## 2024-04-23 PROCEDURE — 99282 EMERGENCY DEPT VISIT SF MDM: CPT

## 2024-04-24 VITALS
SYSTOLIC BLOOD PRESSURE: 128 MMHG | RESPIRATION RATE: 17 BRPM | HEART RATE: 91 BPM | BODY MASS INDEX: 35.51 KG/M2 | WEIGHT: 220 LBS | TEMPERATURE: 98.1 F | OXYGEN SATURATION: 99 % | DIASTOLIC BLOOD PRESSURE: 77 MMHG

## 2024-04-24 ASSESSMENT — ENCOUNTER SYMPTOMS: SHORTNESS OF BREATH: 0

## 2024-04-24 NOTE — ED PROVIDER NOTES
mild stable acute illness    Disposition Considerations (tests considered but not done, Shared Decision Making, Pt Expectation of Test or Tx.): none (unless indicated in ED Course, Summary, Reassessment, or MDM    Discussion with Other Professionals : As indicated in SUMMARY, ED COURSE AND REASSESSMENT, MDM and nurse supervisor ASCENCION Barry     External Records Reviewed : Outpatient Notes   [facility] 4/19 -surgery at [facility], incision drainage, hematoma and abscess      Escalation of care, including admission/OBS considered : Appropriate for outpatient management.     Chronic conditions affecting care:    has a past medical history of Arthritis, Clotting disorder (HCC), Enlarged liver, Hernia, inguinal, left, Hyperlipidemia, Hypertension, and Sleep apnea.    Social Determinants Significantly Affecting  Health:  Social Determinants of Health : None     Disposition: Discussed need to follow up diagnostics, including incidental findings. Discharged with instructions to obtain outpatient follow up of patient's symptoms and findings, with strict return precautions if patient develops new or worsening symptoms.  Follow-up plan and return precautions were provided and discussed in detail patient in agreement.    I am the Primary Clinician of Record.   Disposition: Discussed need to follow up diagnostics, including incidental findings. Discharged with instructions to obtain outpatient follow up of patient's symptoms and findings, with strict return precautions if patient develops new or worsening symptoms.  Follow-up plan and return precautions were provided and discussed in detail patient in agreement.    I am the Primary Clinician of Record.    This patient was evaluated, managed, and treated in the context of the COVID-19 pandemic. As such, associated resources were utilized in his care. I did don/doff appropriate PPE (including N95 mask, safety glasses, gown, gloves), as recommended by the health facility/national standard best

## 2024-04-24 NOTE — DISCHARGE INSTRUCTIONS
Call the orthopedics office tomorrow morning to discuss your visit to the emergency department, and then schedule an appointment this week for reevaluation, of your wound VAC.    Follow-up with them for ongoing care for your wound and fracture.    Return to emergency department if you develop any fever, shortness of breath, chest pain, worsening symptoms or any new concerns.

## 2024-05-01 ENCOUNTER — OFFICE VISIT (OUTPATIENT)
Dept: INTERNAL MEDICINE CLINIC | Age: 34
End: 2024-05-01
Payer: COMMERCIAL

## 2024-05-01 VITALS
OXYGEN SATURATION: 93 % | SYSTOLIC BLOOD PRESSURE: 108 MMHG | DIASTOLIC BLOOD PRESSURE: 70 MMHG | BODY MASS INDEX: 35.36 KG/M2 | HEIGHT: 66 IN | HEART RATE: 82 BPM | RESPIRATION RATE: 20 BRPM | WEIGHT: 220 LBS

## 2024-05-01 DIAGNOSIS — F43.22 ADJUSTMENT DISORDER WITH ANXIETY: Primary | ICD-10-CM

## 2024-05-01 PROCEDURE — 4004F PT TOBACCO SCREEN RCVD TLK: CPT | Performed by: NURSE PRACTITIONER

## 2024-05-01 PROCEDURE — 99213 OFFICE O/P EST LOW 20 MIN: CPT | Performed by: NURSE PRACTITIONER

## 2024-05-01 PROCEDURE — G8427 DOCREV CUR MEDS BY ELIG CLIN: HCPCS | Performed by: NURSE PRACTITIONER

## 2024-05-01 PROCEDURE — G8417 CALC BMI ABV UP PARAM F/U: HCPCS | Performed by: NURSE PRACTITIONER

## 2024-05-01 RX ORDER — SERTRALINE HYDROCHLORIDE 25 MG/1
25 TABLET, FILM COATED ORAL DAILY
Qty: 30 TABLET | Refills: 3 | Status: SHIPPED | OUTPATIENT
Start: 2024-05-01

## 2024-05-01 RX ORDER — LORAZEPAM 0.5 MG/1
0.5 TABLET ORAL EVERY 8 HOURS PRN
Qty: 21 TABLET | Refills: 0 | Status: SHIPPED | OUTPATIENT
Start: 2024-05-01 | End: 2024-05-08

## 2024-05-01 SDOH — ECONOMIC STABILITY: FOOD INSECURITY: WITHIN THE PAST 12 MONTHS, THE FOOD YOU BOUGHT JUST DIDN'T LAST AND YOU DIDN'T HAVE MONEY TO GET MORE.: NEVER TRUE

## 2024-05-01 SDOH — ECONOMIC STABILITY: INCOME INSECURITY: HOW HARD IS IT FOR YOU TO PAY FOR THE VERY BASICS LIKE FOOD, HOUSING, MEDICAL CARE, AND HEATING?: NOT HARD AT ALL

## 2024-05-01 SDOH — ECONOMIC STABILITY: FOOD INSECURITY: WITHIN THE PAST 12 MONTHS, YOU WORRIED THAT YOUR FOOD WOULD RUN OUT BEFORE YOU GOT MONEY TO BUY MORE.: NEVER TRUE

## 2024-05-01 ASSESSMENT — ENCOUNTER SYMPTOMS
SHORTNESS OF BREATH: 0
SINUS PAIN: 0
DIARRHEA: 0
ABDOMINAL PAIN: 0
CONSTIPATION: 0
CHEST TIGHTNESS: 0
NAUSEA: 0
ABDOMINAL DISTENTION: 0
WHEEZING: 0
EYES NEGATIVE: 1
SINUS PRESSURE: 0
COLOR CHANGE: 0
SORE THROAT: 0
COUGH: 0
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
## 2024-05-14 DIAGNOSIS — Z79.2 ENCOUNTER FOR LONG-TERM (CURRENT) USE OF ANTIBIOTICS: Primary | ICD-10-CM

## 2024-05-14 RX ORDER — SODIUM CHLORIDE 0.9 % (FLUSH) 0.9 %
5-40 SYRINGE (ML) INJECTION PRN
OUTPATIENT
Start: 2024-05-14

## 2024-05-30 ENCOUNTER — TELEPHONE (OUTPATIENT)
Dept: GASTROENTEROLOGY | Age: 34
End: 2024-05-30

## 2024-05-30 NOTE — TELEPHONE ENCOUNTER
Pt has had blood in his stool for the past few days and today he started coughing up blood and had to leave work. What would you advise the patient to do? ER, PCP, work him into your schedule? Wait and see?

## 2024-05-31 DIAGNOSIS — K21.9 GASTROESOPHAGEAL REFLUX DISEASE WITHOUT ESOPHAGITIS: Primary | ICD-10-CM

## 2024-05-31 DIAGNOSIS — K92.1 BLOOD IN STOOL: ICD-10-CM

## 2024-06-04 ENCOUNTER — TELEPHONE (OUTPATIENT)
Dept: GASTROENTEROLOGY | Age: 34
End: 2024-06-04

## 2024-06-04 NOTE — TELEPHONE ENCOUNTER
Called pt. In regards to a referral for blood in stool and GERD. LM for pt to call back to make an appt with rocael

## 2024-06-11 ENCOUNTER — TELEPHONE (OUTPATIENT)
Dept: GASTROENTEROLOGY | Age: 34
End: 2024-06-11

## 2024-06-12 ENCOUNTER — OFFICE VISIT (OUTPATIENT)
Dept: INTERNAL MEDICINE CLINIC | Age: 34
End: 2024-06-12
Payer: COMMERCIAL

## 2024-06-12 VITALS
DIASTOLIC BLOOD PRESSURE: 78 MMHG | HEART RATE: 89 BPM | WEIGHT: 214.6 LBS | RESPIRATION RATE: 20 BRPM | BODY MASS INDEX: 34.49 KG/M2 | HEIGHT: 66 IN | SYSTOLIC BLOOD PRESSURE: 122 MMHG | OXYGEN SATURATION: 97 %

## 2024-06-12 DIAGNOSIS — F43.22 ADJUSTMENT DISORDER WITH ANXIETY: Primary | ICD-10-CM

## 2024-06-12 DIAGNOSIS — R45.4 IRRITABILITY: ICD-10-CM

## 2024-06-12 PROCEDURE — 4004F PT TOBACCO SCREEN RCVD TLK: CPT | Performed by: NURSE PRACTITIONER

## 2024-06-12 PROCEDURE — G8427 DOCREV CUR MEDS BY ELIG CLIN: HCPCS | Performed by: NURSE PRACTITIONER

## 2024-06-12 PROCEDURE — 99214 OFFICE O/P EST MOD 30 MIN: CPT | Performed by: NURSE PRACTITIONER

## 2024-06-12 PROCEDURE — G8417 CALC BMI ABV UP PARAM F/U: HCPCS | Performed by: NURSE PRACTITIONER

## 2024-06-12 RX ORDER — LAMOTRIGINE 25 MG/1
25 TABLET ORAL DAILY
Qty: 30 TABLET | Refills: 3 | Status: SHIPPED | OUTPATIENT
Start: 2024-06-12

## 2024-06-12 ASSESSMENT — ENCOUNTER SYMPTOMS
SINUS PRESSURE: 0
ABDOMINAL PAIN: 0
EYES NEGATIVE: 1
DIARRHEA: 0
NAUSEA: 0
COUGH: 0
ABDOMINAL DISTENTION: 0
CONSTIPATION: 0
SINUS PAIN: 0
SHORTNESS OF BREATH: 0
CHEST TIGHTNESS: 0
SORE THROAT: 0
COLOR CHANGE: 0
WHEEZING: 0
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
## 2024-06-18 ENCOUNTER — TELEPHONE (OUTPATIENT)
Dept: GASTROENTEROLOGY | Age: 34
End: 2024-06-18

## 2024-06-18 NOTE — TELEPHONE ENCOUNTER
Called pt to discuss a referral in for GERD and rectal bleeding. LM for pt to call back for an appt izabela/ Ahsan

## 2024-08-26 DIAGNOSIS — R45.4 IRRITABILITY: ICD-10-CM

## 2024-08-26 DIAGNOSIS — F43.22 ADJUSTMENT DISORDER WITH ANXIETY: ICD-10-CM

## 2024-08-26 RX ORDER — SERTRALINE HYDROCHLORIDE 100 MG/1
100 TABLET, FILM COATED ORAL DAILY
Qty: 30 TABLET | Refills: 2 | Status: SHIPPED | OUTPATIENT
Start: 2024-08-26

## 2024-08-26 RX ORDER — LAMOTRIGINE 25 MG/1
50 TABLET ORAL DAILY
Qty: 60 TABLET | Refills: 2 | Status: SHIPPED | OUTPATIENT
Start: 2024-08-26

## 2024-09-09 DIAGNOSIS — F43.22 ADJUSTMENT DISORDER WITH ANXIETY: ICD-10-CM

## 2024-09-10 RX ORDER — SERTRALINE HYDROCHLORIDE 100 MG/1
100 TABLET, FILM COATED ORAL DAILY
Qty: 90 TABLET | Refills: 0 | Status: SHIPPED | OUTPATIENT
Start: 2024-09-10

## 2024-09-20 DIAGNOSIS — F43.22 ADJUSTMENT DISORDER WITH ANXIETY: ICD-10-CM

## 2024-09-20 DIAGNOSIS — R45.4 IRRITABILITY: ICD-10-CM

## 2024-09-20 RX ORDER — LAMOTRIGINE 25 MG/1
50 TABLET ORAL DAILY
Qty: 60 TABLET | Refills: 2 | Status: SHIPPED | OUTPATIENT
Start: 2024-09-20

## 2024-10-06 DIAGNOSIS — R45.4 IRRITABILITY: ICD-10-CM

## 2024-10-06 DIAGNOSIS — F43.22 ADJUSTMENT DISORDER WITH ANXIETY: ICD-10-CM

## 2024-10-07 RX ORDER — LAMOTRIGINE 25 MG/1
25 TABLET ORAL DAILY
Qty: 90 TABLET | Refills: 1 | OUTPATIENT
Start: 2024-10-07

## 2024-12-10 DIAGNOSIS — F43.22 ADJUSTMENT DISORDER WITH ANXIETY: ICD-10-CM

## 2024-12-10 RX ORDER — SERTRALINE HYDROCHLORIDE 100 MG/1
100 TABLET, FILM COATED ORAL DAILY
Qty: 90 TABLET | Refills: 0 | OUTPATIENT
Start: 2024-12-10

## 2024-12-23 ENCOUNTER — OFFICE VISIT (OUTPATIENT)
Dept: INTERNAL MEDICINE CLINIC | Age: 34
End: 2024-12-23

## 2024-12-23 VITALS
DIASTOLIC BLOOD PRESSURE: 74 MMHG | OXYGEN SATURATION: 96 % | BODY MASS INDEX: 35.52 KG/M2 | SYSTOLIC BLOOD PRESSURE: 112 MMHG | WEIGHT: 221 LBS | HEART RATE: 94 BPM | RESPIRATION RATE: 20 BRPM | HEIGHT: 66 IN

## 2024-12-23 DIAGNOSIS — R45.4 IRRITABILITY: ICD-10-CM

## 2024-12-23 DIAGNOSIS — F43.22 ADJUSTMENT DISORDER WITH ANXIETY: Primary | ICD-10-CM

## 2024-12-23 DIAGNOSIS — G47.10 HYPERSOMNOLENCE: ICD-10-CM

## 2024-12-23 RX ORDER — SERTRALINE HYDROCHLORIDE 100 MG/1
100 TABLET, FILM COATED ORAL DAILY
Qty: 90 TABLET | Refills: 1 | Status: SHIPPED | OUTPATIENT
Start: 2024-12-23

## 2024-12-23 RX ORDER — LAMOTRIGINE 25 MG/1
50 TABLET ORAL DAILY
Qty: 180 TABLET | Refills: 1 | Status: SHIPPED | OUTPATIENT
Start: 2024-12-23

## 2024-12-23 ASSESSMENT — ENCOUNTER SYMPTOMS
SORE THROAT: 0
CONSTIPATION: 0
DIARRHEA: 0
NAUSEA: 0
SHORTNESS OF BREATH: 0
COUGH: 0
CHEST TIGHTNESS: 0
SINUS PAIN: 0
EYES NEGATIVE: 1
COLOR CHANGE: 0
ABDOMINAL PAIN: 0
WHEEZING: 0
SINUS PRESSURE: 0
ABDOMINAL DISTENTION: 0
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
## 2025-02-24 DIAGNOSIS — F43.21 ADJUSTMENT DISORDER WITH DEPRESSED MOOD: ICD-10-CM

## 2025-02-24 DIAGNOSIS — R45.4 IRRITABILITY: Primary | ICD-10-CM

## 2025-04-10 ENCOUNTER — HOSPITAL ENCOUNTER (OUTPATIENT)
Dept: SLEEP CENTER | Age: 35
Discharge: HOME | End: 2025-04-10
Payer: COMMERCIAL

## 2025-04-10 VITALS
BODY MASS INDEX: 36.96 KG/M2 | WEIGHT: 230 LBS | OXYGEN SATURATION: 96 % | HEIGHT: 66 IN | DIASTOLIC BLOOD PRESSURE: 55 MMHG | SYSTOLIC BLOOD PRESSURE: 130 MMHG | HEART RATE: 100 BPM

## 2025-04-10 DIAGNOSIS — F17.210 CIGARETTE SMOKER: ICD-10-CM

## 2025-04-10 DIAGNOSIS — G25.81 RESTLESS LEG SYNDROME: ICD-10-CM

## 2025-04-10 DIAGNOSIS — I27.20 PULMONARY HYPERTENSION (HCC): ICD-10-CM

## 2025-04-10 DIAGNOSIS — R06.02 SOBOE (SHORTNESS OF BREATH ON EXERTION): ICD-10-CM

## 2025-04-10 DIAGNOSIS — G47.33 OSA (OBSTRUCTIVE SLEEP APNEA): Primary | ICD-10-CM

## 2025-04-10 DIAGNOSIS — G47.10 HYPERSOMNIA: ICD-10-CM

## 2025-04-10 DIAGNOSIS — E66.9 OBESITY (BMI 30-39.9): ICD-10-CM

## 2025-04-10 DIAGNOSIS — D64.9 ANEMIA, UNSPECIFIED TYPE: ICD-10-CM

## 2025-04-10 PROCEDURE — 99205 OFFICE O/P NEW HI 60 MIN: CPT | Performed by: INTERNAL MEDICINE

## 2025-04-10 PROCEDURE — 99211 OFF/OP EST MAY X REQ PHY/QHP: CPT

## 2025-04-10 RX ORDER — PANTOPRAZOLE SODIUM 40 MG/1
40 TABLET, DELAYED RELEASE ORAL 2 TIMES DAILY
COMMUNITY
Start: 2025-03-21 | End: 2025-04-20

## 2025-04-10 RX ORDER — IBUPROFEN 400 MG/1
400 TABLET, FILM COATED ORAL EVERY 6 HOURS PRN
COMMUNITY
Start: 2025-03-12

## 2025-04-10 RX ORDER — OXYCODONE AND ACETAMINOPHEN 5; 325 MG/1; MG/1
1 TABLET ORAL EVERY 6 HOURS PRN
COMMUNITY
Start: 2025-03-12

## 2025-04-10 RX ORDER — METHOCARBAMOL 750 MG/1
TABLET, FILM COATED ORAL
COMMUNITY
Start: 2025-03-21

## 2025-04-10 RX ORDER — DIAZEPAM 10 MG/1
10 TABLET ORAL EVERY 8 HOURS
COMMUNITY
Start: 2025-04-01

## 2025-04-10 RX ORDER — PSEUDOEPHED/ACETAMINOPH/DIPHEN 30MG-500MG
TABLET ORAL
COMMUNITY
Start: 2025-03-12

## 2025-04-10 RX ORDER — DIAZEPAM 5 MG/1
5 TABLET ORAL EVERY 8 HOURS PRN
COMMUNITY
Start: 2025-04-09 | End: 2025-04-23

## 2025-04-10 RX ORDER — ASPIRIN 325 MG
325 TABLET, DELAYED RELEASE (ENTERIC COATED) ORAL DAILY
COMMUNITY
Start: 2025-03-21 | End: 2025-04-20

## 2025-04-10 RX ORDER — ENOXAPARIN SODIUM 100 MG/ML
INJECTION SUBCUTANEOUS
COMMUNITY
Start: 2025-03-21

## 2025-04-10 RX ORDER — CEPHALEXIN 500 MG/1
1000 CAPSULE ORAL EVERY 12 HOURS
COMMUNITY
Start: 2025-03-21 | End: 2025-04-18

## 2025-04-10 RX ORDER — GABAPENTIN 300 MG/1
CAPSULE ORAL
COMMUNITY
Start: 2025-03-21

## 2025-04-10 RX ORDER — OXYCODONE HYDROCHLORIDE 15 MG/1
TABLET ORAL
COMMUNITY
Start: 2025-04-09

## 2025-04-10 RX ORDER — HYDROXYZINE HYDROCHLORIDE 10 MG/1
TABLET, FILM COATED ORAL
COMMUNITY
Start: 2025-04-09

## 2025-04-10 ASSESSMENT — SLEEP AND FATIGUE QUESTIONNAIRES
HOW LIKELY ARE YOU TO NOD OFF OR FALL ASLEEP WHILE SITTING QUIETLY AFTER LUNCH WITHOUT ALCOHOL: MODERATE CHANCE OF DOZING
HOW LIKELY ARE YOU TO NOD OFF OR FALL ASLEEP WHEN YOU ARE A PASSENGER IN A CAR FOR AN HOUR WITHOUT A BREAK: SLIGHT CHANCE OF DOZING
HOW LIKELY ARE YOU TO NOD OFF OR FALL ASLEEP WHILE SITTING INACTIVE IN A PUBLIC PLACE: WOULD NEVER DOZE
HOW LIKELY ARE YOU TO NOD OFF OR FALL ASLEEP WHILE LYING DOWN TO REST IN THE AFTERNOON WHEN CIRCUMSTANCES PERMIT: HIGH CHANCE OF DOZING
HOW LIKELY ARE YOU TO NOD OFF OR FALL ASLEEP WHILE WATCHING TV: SLIGHT CHANCE OF DOZING
ESS TOTAL SCORE: 9
HOW LIKELY ARE YOU TO NOD OFF OR FALL ASLEEP WHILE SITTING AND TALKING TO SOMEONE: SLIGHT CHANCE OF DOZING
HOW LIKELY ARE YOU TO NOD OFF OR FALL ASLEEP WHILE SITTING AND READING: SLIGHT CHANCE OF DOZING
HOW LIKELY ARE YOU TO NOD OFF OR FALL ASLEEP IN A CAR, WHILE STOPPED FOR A FEW MINUTES IN TRAFFIC: WOULD NEVER DOZE

## 2025-04-10 NOTE — CONSULTS
Socioeconomic History    Marital status:      Spouse name: Not on file    Number of children: Not on file    Years of education: Not on file    Highest education level: Not on file   Occupational History    Not on file   Tobacco Use    Smoking status: Every Day     Current packs/day: 0.50     Average packs/day: 0.5 packs/day for 21.0 years (10.5 ttl pk-yrs)     Types: Cigarettes    Smokeless tobacco: Former     Types: Chew   Vaping Use    Vaping status: Never Used   Substance and Sexual Activity    Alcohol use: No    Drug use: No    Sexual activity: Yes     Partners: Female   Other Topics Concern    Not on file   Social History Narrative    [employer]    Exercise - PT for work         Social Drivers of Health     Financial Resource Strain: High Risk (3/17/2025)    Received from [facility]    Overall Financial Resource Strain (CARDIA)     Difficulty of Paying Living Expenses: Hard   Food Insecurity: No Food Insecurity (3/17/2025)    Received from [facility]    NCSS - Food Insecurity     Worried About Running Out of Food in the Last Year: No     Ran Out of Food in the Last Year: No   Transportation Needs: No Transportation Needs (3/17/2025)    Received from [facility]    NCSS - Transportation     Lack of Transportation: No   Physical Activity: Not on file   Stress: Not on file   Social Connections: Not on file   Intimate Partner Violence: Not At Risk (1/6/2025)    Received from [facility]    Humiliation, Afraid, Rape, and Kick questionnaire     Fear of Current or Ex-Partner: No     Emotionally Abused: No     Physically Abused: No     Sexually Abused: No   Housing Stability: At Risk (3/17/2025)    Received from [facility]    NCSS - Housing/Utilities     Has Housing: Yes     Worried About Losing Housing: Yes     Unable to Get Utilities: No

## 2025-04-15 ENCOUNTER — HOSPITAL ENCOUNTER (OUTPATIENT)
Dept: SLEEP CENTER | Age: 35
Discharge: HOME | End: 2025-04-15
Payer: COMMERCIAL

## 2025-04-15 DIAGNOSIS — G47.33 OSA (OBSTRUCTIVE SLEEP APNEA): ICD-10-CM

## 2025-04-15 PROCEDURE — 95810 POLYSOM 6/> YRS 4/> PARAM: CPT

## 2025-04-16 NOTE — PROGRESS NOTES
4/16/2025  sleep study  for Juaquin Brantley  1990 is complete.      Results are pending physician review.    Electronically signed by Melissa Larson on 4/16/2025 at 7:21 AM

## 2025-04-25 ENCOUNTER — TELEPHONE (OUTPATIENT)
Dept: INTERNAL MEDICINE CLINIC | Age: 35
End: 2025-04-25

## 2025-06-09 ENCOUNTER — OFFICE VISIT (OUTPATIENT)
Dept: INTERNAL MEDICINE CLINIC | Age: 35
End: 2025-06-09
Payer: COMMERCIAL

## 2025-06-09 VITALS
HEART RATE: 103 BPM | RESPIRATION RATE: 20 BRPM | SYSTOLIC BLOOD PRESSURE: 124 MMHG | WEIGHT: 235 LBS | BODY MASS INDEX: 37.77 KG/M2 | DIASTOLIC BLOOD PRESSURE: 76 MMHG | HEIGHT: 66 IN | OXYGEN SATURATION: 98 %

## 2025-06-09 DIAGNOSIS — F43.22 ADJUSTMENT DISORDER WITH ANXIETY: ICD-10-CM

## 2025-06-09 DIAGNOSIS — F51.4 NIGHT TERROR: Primary | ICD-10-CM

## 2025-06-09 DIAGNOSIS — R45.4 IRRITABILITY: ICD-10-CM

## 2025-06-09 PROCEDURE — 4004F PT TOBACCO SCREEN RCVD TLK: CPT | Performed by: NURSE PRACTITIONER

## 2025-06-09 PROCEDURE — G8427 DOCREV CUR MEDS BY ELIG CLIN: HCPCS | Performed by: NURSE PRACTITIONER

## 2025-06-09 PROCEDURE — G8417 CALC BMI ABV UP PARAM F/U: HCPCS | Performed by: NURSE PRACTITIONER

## 2025-06-09 PROCEDURE — 99214 OFFICE O/P EST MOD 30 MIN: CPT | Performed by: NURSE PRACTITIONER

## 2025-06-09 PROCEDURE — G2211 COMPLEX E/M VISIT ADD ON: HCPCS | Performed by: NURSE PRACTITIONER

## 2025-06-09 RX ORDER — PANTOPRAZOLE SODIUM 40 MG/1
40 TABLET, DELAYED RELEASE ORAL DAILY
COMMUNITY
Start: 2025-05-21 | End: 2025-07-20

## 2025-06-09 RX ORDER — SENNOSIDES 8.6 MG/1
1 TABLET, COATED ORAL EVERY 12 HOURS
COMMUNITY
Start: 2025-03-21

## 2025-06-09 RX ORDER — SERTRALINE HYDROCHLORIDE 100 MG/1
100 TABLET, FILM COATED ORAL DAILY
Qty: 90 TABLET | Refills: 1 | Status: SHIPPED | OUTPATIENT
Start: 2025-06-09

## 2025-06-09 RX ORDER — DULOXETIN HYDROCHLORIDE 30 MG/1
30 CAPSULE, DELAYED RELEASE ORAL DAILY
COMMUNITY
Start: 2025-04-11

## 2025-06-09 RX ORDER — PRAZOSIN HYDROCHLORIDE 1 MG/1
1 CAPSULE ORAL NIGHTLY
Qty: 30 CAPSULE | Refills: 3 | Status: SHIPPED | OUTPATIENT
Start: 2025-06-09

## 2025-06-09 RX ORDER — MELOXICAM 7.5 MG/1
7.5 TABLET ORAL DAILY
COMMUNITY
Start: 2025-05-21

## 2025-06-09 SDOH — ECONOMIC STABILITY: FOOD INSECURITY: WITHIN THE PAST 12 MONTHS, THE FOOD YOU BOUGHT JUST DIDN'T LAST AND YOU DIDN'T HAVE MONEY TO GET MORE.: NEVER TRUE

## 2025-06-09 SDOH — ECONOMIC STABILITY: FOOD INSECURITY: WITHIN THE PAST 12 MONTHS, YOU WORRIED THAT YOUR FOOD WOULD RUN OUT BEFORE YOU GOT MONEY TO BUY MORE.: NEVER TRUE

## 2025-06-09 ASSESSMENT — ENCOUNTER SYMPTOMS
COUGH: 0
ABDOMINAL PAIN: 0
EYES NEGATIVE: 1
NAUSEA: 0
SORE THROAT: 0
DIARRHEA: 0
CONSTIPATION: 0
COLOR CHANGE: 0
SHORTNESS OF BREATH: 0
WHEEZING: 0
SINUS PRESSURE: 0
CHEST TIGHTNESS: 0
ABDOMINAL DISTENTION: 0
SINUS PAIN: 0

## 2025-06-09 ASSESSMENT — PATIENT HEALTH QUESTIONNAIRE - PHQ9
DEPRESSION UNABLE TO ASSESS: FUNCTIONAL CAPACITY MOTIVATION LIMITS ACCURACY
SUM OF ALL RESPONSES TO PHQ QUESTIONS 1-9: 21
5. POOR APPETITE OR OVEREATING: NEARLY EVERY DAY
7. TROUBLE CONCENTRATING ON THINGS, SUCH AS READING THE NEWSPAPER OR WATCHING TELEVISION: NOT AT ALL
9. THOUGHTS THAT YOU WOULD BE BETTER OFF DEAD, OR OF HURTING YOURSELF: NOT AT ALL
SUM OF ALL RESPONSES TO PHQ QUESTIONS 1-9: 21
8. MOVING OR SPEAKING SO SLOWLY THAT OTHER PEOPLE COULD HAVE NOTICED. OR THE OPPOSITE, BEING SO FIGETY OR RESTLESS THAT YOU HAVE BEEN MOVING AROUND A LOT MORE THAN USUAL: NEARLY EVERY DAY
6. FEELING BAD ABOUT YOURSELF - OR THAT YOU ARE A FAILURE OR HAVE LET YOURSELF OR YOUR FAMILY DOWN: NEARLY EVERY DAY
4. FEELING TIRED OR HAVING LITTLE ENERGY: NEARLY EVERY DAY
3. TROUBLE FALLING OR STAYING ASLEEP: NEARLY EVERY DAY
2. FEELING DOWN, DEPRESSED OR HOPELESS: NEARLY EVERY DAY
1. LITTLE INTEREST OR PLEASURE IN DOING THINGS: NEARLY EVERY DAY
10. IF YOU CHECKED OFF ANY PROBLEMS, HOW DIFFICULT HAVE THESE PROBLEMS MADE IT FOR YOU TO DO YOUR WORK, TAKE CARE OF THINGS AT HOME, OR GET ALONG WITH OTHER PEOPLE: VERY DIFFICULT

## 2025-06-09 ASSESSMENT — COLUMBIA-SUICIDE SEVERITY RATING SCALE - C-SSRS
2. HAVE YOU ACTUALLY HAD ANY THOUGHTS OF KILLING YOURSELF?: NO
1. WITHIN THE PAST MONTH, HAVE YOU WISHED YOU WERE DEAD OR WISHED YOU COULD GO TO SLEEP AND NOT WAKE UP?: NO
6. HAVE YOU EVER DONE ANYTHING, STARTED TO DO ANYTHING, OR PREPARED TO DO ANYTHING TO END YOUR LIFE?: NO

## 2025-06-09 NOTE — PROGRESS NOTES
Psychiatry  -     sertraline (ZOLOFT) 100 MG tablet; Take 1 tablet by mouth daily  -     sertraline (ZOLOFT) 50 MG tablet; Take 1 tablet by mouth daily    Irritability  -     External Referral To Psychiatry  -     sertraline (ZOLOFT) 100 MG tablet; Take 1 tablet by mouth daily  -     sertraline (ZOLOFT) 50 MG tablet; Take 1 tablet by mouth daily             No data to display                Return in about 4 months (around 10/9/2025).    Makayla note this reports has been produced speech recognition software and may contain errors related to that system including errors in grammar, punctuation, and spelling, as well as words and phrases that may be appropriate. If there are any questions or concerns please feel free to contact the dictating provider for clarification.

## 2025-08-10 DIAGNOSIS — R45.4 IRRITABILITY: ICD-10-CM

## 2025-08-10 DIAGNOSIS — F43.22 ADJUSTMENT DISORDER WITH ANXIETY: ICD-10-CM

## 2025-08-11 RX ORDER — SERTRALINE HYDROCHLORIDE 100 MG/1
100 TABLET, FILM COATED ORAL DAILY
Qty: 90 TABLET | Refills: 1 | Status: SHIPPED | OUTPATIENT
Start: 2025-08-11

## 2025-08-11 RX ORDER — LAMOTRIGINE 25 MG/1
50 TABLET ORAL DAILY
Qty: 180 TABLET | Refills: 1 | Status: SHIPPED | OUTPATIENT
Start: 2025-08-11

## (undated) DEVICE — Z DISCONTINUED (USE MFG CAT MVABO)  TUBING GAS SAMPLING STD 6.5 FT FEMALE CONN SMRT CAPNOLINE

## (undated) DEVICE — Z DISCONTINUED NO SUB IDED TUBING ETCO2 AD L6.5FT NSL ORAL CVD PRNG NONFLARED TIP OVR

## (undated) DEVICE — FORCEPS BX L240CM JAW DIA2.8MM L CAP W/ NDL MIC MESH TOOTH

## (undated) DEVICE — ENDOSCOPY KIT: Brand: MEDLINE INDUSTRIES, INC.